# Patient Record
Sex: FEMALE | Race: WHITE | NOT HISPANIC OR LATINO | Employment: UNEMPLOYED | URBAN - METROPOLITAN AREA
[De-identification: names, ages, dates, MRNs, and addresses within clinical notes are randomized per-mention and may not be internally consistent; named-entity substitution may affect disease eponyms.]

---

## 2022-08-02 ENCOUNTER — OFFICE VISIT (OUTPATIENT)
Dept: FAMILY MEDICINE | Facility: CLINIC | Age: 27
End: 2022-08-02
Payer: OTHER GOVERNMENT

## 2022-08-02 VITALS
WEIGHT: 144.94 LBS | HEART RATE: 76 BPM | DIASTOLIC BLOOD PRESSURE: 71 MMHG | SYSTOLIC BLOOD PRESSURE: 121 MMHG | RESPIRATION RATE: 16 BRPM | BODY MASS INDEX: 25.68 KG/M2 | HEIGHT: 63 IN

## 2022-08-02 DIAGNOSIS — E03.8 OTHER SPECIFIED HYPOTHYROIDISM: ICD-10-CM

## 2022-08-02 DIAGNOSIS — E10.9 TYPE 1 DIABETES MELLITUS WITHOUT COMPLICATION: Primary | ICD-10-CM

## 2022-08-02 DIAGNOSIS — E03.9 HYPOTHYROIDISM, UNSPECIFIED TYPE: ICD-10-CM

## 2022-08-02 DIAGNOSIS — Z80.8 FAMILY HISTORY OF MELANOMA: ICD-10-CM

## 2022-08-02 DIAGNOSIS — M25.50 POLYARTHRALGIA: ICD-10-CM

## 2022-08-02 PROCEDURE — 99999 PR PBB SHADOW E&M-NEW PATIENT-LVL IV: ICD-10-PCS | Mod: PBBFAC,,, | Performed by: FAMILY MEDICINE

## 2022-08-02 PROCEDURE — 99205 PR OFFICE/OUTPT VISIT, NEW, LEVL V, 60-74 MIN: ICD-10-PCS | Mod: S$PBB,,, | Performed by: FAMILY MEDICINE

## 2022-08-02 PROCEDURE — 99205 OFFICE O/P NEW HI 60 MIN: CPT | Mod: S$PBB,,, | Performed by: FAMILY MEDICINE

## 2022-08-02 PROCEDURE — 99204 OFFICE O/P NEW MOD 45 MIN: CPT | Mod: PBBFAC | Performed by: FAMILY MEDICINE

## 2022-08-02 PROCEDURE — 99999 PR PBB SHADOW E&M-NEW PATIENT-LVL IV: CPT | Mod: PBBFAC,,, | Performed by: FAMILY MEDICINE

## 2022-08-02 RX ORDER — LEVOTHYROXINE SODIUM 100 UG/1
100 TABLET ORAL
COMMUNITY
End: 2022-08-19 | Stop reason: SDUPTHER

## 2022-08-02 RX ORDER — BUSPIRONE HYDROCHLORIDE 10 MG/1
10 TABLET ORAL 2 TIMES DAILY
COMMUNITY
End: 2023-02-28 | Stop reason: SDUPTHER

## 2022-08-02 RX ORDER — INSULIN ASPART 100 [IU]/ML
INJECTION, SOLUTION INTRAVENOUS; SUBCUTANEOUS
COMMUNITY
End: 2022-11-01 | Stop reason: SDUPTHER

## 2022-08-02 NOTE — PROGRESS NOTES
Subjective:       Patient ID: Shirlene Shook is a 26 y.o. female.    Chief Complaint: Establish Care    HPI  26 year old female comes in to establish care. She has a 'lot of ai' issues. She says that she wakes up in the morning with joint pain and there was a concern for RA. She says that she is DM type 1. She says she had a pump and was very well controlled. She has not been to endo and has been doing injections and is not having as much control. She was dx'd 10 years and 5 days ago. She was previously on omnipod and now dexcom g6.    Mom with h/o melanoma.    She has not seen ophthalmology in some time.    PMH, PSH, ALLERGIES, SH, FH reviewed in nurse's notes above  Medications reconciled in the nurse's notes    Review of Systems   Constitutional: Negative for chills and fever.   HENT: Negative for congestion, ear pain, postnasal drip, rhinorrhea, sore throat and trouble swallowing.    Eyes: Negative for redness and itching.   Respiratory: Negative for cough, shortness of breath and wheezing.    Cardiovascular: Negative for chest pain and palpitations.   Gastrointestinal: Negative for abdominal pain, diarrhea, nausea and vomiting.   Genitourinary: Negative for dysuria and frequency.   Musculoskeletal: Positive for arthralgias.   Skin: Negative for rash.   Neurological: Negative for weakness and headaches.       Objective:      Physical Exam  Vitals and nursing note reviewed.   Constitutional:       General: She is not in acute distress.     Appearance: She is well-developed.   HENT:      Head: Normocephalic and atraumatic.   Eyes:      Conjunctiva/sclera: Conjunctivae normal.      Pupils: Pupils are equal, round, and reactive to light.   Neck:      Thyroid: No thyromegaly.   Cardiovascular:      Rate and Rhythm: Normal rate and regular rhythm.      Heart sounds: Normal heart sounds.   Pulmonary:      Effort: Pulmonary effort is normal. No respiratory distress.      Breath sounds: Normal breath sounds. No wheezing.    Abdominal:      General: Bowel sounds are normal.      Palpations: Abdomen is soft.      Tenderness: There is no abdominal tenderness.   Musculoskeletal:         General: Normal range of motion.      Cervical back: Normal range of motion and neck supple.   Lymphadenopathy:      Cervical: No cervical adenopathy.   Skin:     General: Skin is warm and dry.      Findings: No rash.   Neurological:      Mental Status: She is alert and oriented to person, place, and time.   Psychiatric:         Behavior: Behavior normal.          Assessment/Plan:       Problem List Items Addressed This Visit        Endocrine    Other specified hypothyroidism      Other Visit Diagnoses     Type 1 diabetes mellitus without complication    -  Primary    Relevant Medications    insulin aspart U-100 (NOVOLOG) 100 unit/mL injection    insulin glargine,hum.rec.anlog (LANTUS SUBQ)    Other Relevant Orders    Ambulatory referral/consult to Endocrinology    Ambulatory referral/consult to Ophthalmology    Hemoglobin A1C    Hypothyroidism, unspecified type        Relevant Orders    TSH    Polyarthralgia        Relevant Orders    TYESHA Screen w/Reflex    Rheumatoid Factor    CYCLIC CITRUL PEPTIDE ANTIBODY, IGG    Sedimentation rate    C-Reactive Protein        RTC if condition acutely worsens or any other concerns, otherwise RTC as scheduled

## 2022-08-05 ENCOUNTER — CLINICAL SUPPORT (OUTPATIENT)
Dept: FAMILY MEDICINE | Facility: CLINIC | Age: 27
End: 2022-08-05
Payer: OTHER GOVERNMENT

## 2022-08-05 ENCOUNTER — TELEPHONE (OUTPATIENT)
Dept: FAMILY MEDICINE | Facility: CLINIC | Age: 27
End: 2022-08-05
Payer: OTHER GOVERNMENT

## 2022-08-05 DIAGNOSIS — E03.9 HYPOTHYROIDISM, UNSPECIFIED TYPE: ICD-10-CM

## 2022-08-05 DIAGNOSIS — M25.50 POLYARTHRALGIA: ICD-10-CM

## 2022-08-05 DIAGNOSIS — E10.9 TYPE 1 DIABETES MELLITUS WITHOUT COMPLICATION: ICD-10-CM

## 2022-08-05 LAB
ALBUMIN SERPL BCP-MCNC: 4 G/DL (ref 3.5–5.2)
ALP SERPL-CCNC: 54 U/L (ref 55–135)
ALT SERPL W/O P-5'-P-CCNC: 13 U/L (ref 10–44)
ANION GAP SERPL CALC-SCNC: 7 MMOL/L (ref 8–16)
AST SERPL-CCNC: 15 U/L (ref 10–40)
BILIRUB SERPL-MCNC: 0.4 MG/DL (ref 0.1–1)
BUN SERPL-MCNC: 11 MG/DL (ref 6–20)
CALCIUM SERPL-MCNC: 9.8 MG/DL (ref 8.7–10.5)
CHLORIDE SERPL-SCNC: 102 MMOL/L (ref 95–110)
CHOLEST SERPL-MCNC: 129 MG/DL (ref 120–199)
CHOLEST/HDLC SERPL: 2.9 {RATIO} (ref 2–5)
CO2 SERPL-SCNC: 27 MMOL/L (ref 23–29)
CREAT SERPL-MCNC: 0.8 MG/DL (ref 0.5–1.4)
ERYTHROCYTE [SEDIMENTATION RATE] IN BLOOD BY WESTERGREN METHOD: 5 MM/HR (ref 0–20)
EST. GFR  (NO RACE VARIABLE): >60 ML/MIN/1.73 M^2
GLUCOSE SERPL-MCNC: 172 MG/DL (ref 70–110)
HDLC SERPL-MCNC: 45 MG/DL (ref 40–75)
HDLC SERPL: 34.9 % (ref 20–50)
LDLC SERPL CALC-MCNC: 70 MG/DL (ref 63–159)
NONHDLC SERPL-MCNC: 84 MG/DL
POTASSIUM SERPL-SCNC: 5.1 MMOL/L (ref 3.5–5.1)
PROT SERPL-MCNC: 6.7 G/DL (ref 6–8.4)
RHEUMATOID FACT SERPL-ACNC: <13 IU/ML (ref 0–15)
SODIUM SERPL-SCNC: 136 MMOL/L (ref 136–145)
T4 FREE SERPL-MCNC: 1.02 NG/DL (ref 0.71–1.51)
TRIGL SERPL-MCNC: 70 MG/DL (ref 30–150)
TSH SERPL DL<=0.005 MIU/L-ACNC: 0.35 UIU/ML (ref 0.4–4)

## 2022-08-05 PROCEDURE — 85651 RBC SED RATE NONAUTOMATED: CPT | Performed by: FAMILY MEDICINE

## 2022-08-05 PROCEDURE — 86200 CCP ANTIBODY: CPT | Performed by: FAMILY MEDICINE

## 2022-08-05 PROCEDURE — 84439 ASSAY OF FREE THYROXINE: CPT | Performed by: FAMILY MEDICINE

## 2022-08-05 PROCEDURE — 83036 HEMOGLOBIN GLYCOSYLATED A1C: CPT | Performed by: FAMILY MEDICINE

## 2022-08-05 PROCEDURE — 80061 LIPID PANEL: CPT | Performed by: FAMILY MEDICINE

## 2022-08-05 PROCEDURE — 86038 ANTINUCLEAR ANTIBODIES: CPT | Performed by: FAMILY MEDICINE

## 2022-08-05 PROCEDURE — 36415 COLL VENOUS BLD VENIPUNCTURE: CPT | Mod: PBBFAC

## 2022-08-05 PROCEDURE — 86140 C-REACTIVE PROTEIN: CPT | Performed by: FAMILY MEDICINE

## 2022-08-05 PROCEDURE — 86039 ANTINUCLEAR ANTIBODIES (ANA): CPT | Performed by: FAMILY MEDICINE

## 2022-08-05 PROCEDURE — 86235 NUCLEAR ANTIGEN ANTIBODY: CPT | Mod: 59 | Performed by: FAMILY MEDICINE

## 2022-08-05 PROCEDURE — 86431 RHEUMATOID FACTOR QUANT: CPT | Performed by: FAMILY MEDICINE

## 2022-08-05 PROCEDURE — 80053 COMPREHEN METABOLIC PANEL: CPT | Performed by: FAMILY MEDICINE

## 2022-08-05 PROCEDURE — 84443 ASSAY THYROID STIM HORMONE: CPT | Performed by: FAMILY MEDICINE

## 2022-08-05 RX ORDER — INSULIN GLARGINE 100 [IU]/ML
36 INJECTION, SOLUTION SUBCUTANEOUS NIGHTLY
Qty: 20 ML | Refills: 1 | Status: SHIPPED | OUTPATIENT
Start: 2022-08-05 | End: 2022-08-31 | Stop reason: SDUPTHER

## 2022-08-06 LAB
CCP AB SER IA-ACNC: <0.5 U/ML
CRP SERPL-MCNC: 1 MG/L (ref 0–8.2)
ESTIMATED AVG GLUCOSE: 183 MG/DL (ref 68–131)
HBA1C MFR BLD: 8 % (ref 4–5.6)

## 2022-08-08 LAB
ANA PATTERN 1: NORMAL
ANA PATTERN 2: NORMAL
ANA SER QL IF: POSITIVE
ANA TITER 2: NORMAL
ANA TITR SER IF: NORMAL {TITER}

## 2022-08-09 LAB
ANTI SM ANTIBODY: 0.09 RATIO (ref 0–0.99)
ANTI SM/RNP ANTIBODY: 0.11 RATIO (ref 0–0.99)
ANTI-SM INTERPRETATION: NEGATIVE
ANTI-SM/RNP INTERPRETATION: NEGATIVE
ANTI-SSA ANTIBODY: 0.05 RATIO (ref 0–0.99)
ANTI-SSA INTERPRETATION: NEGATIVE
ANTI-SSB ANTIBODY: 0.08 RATIO (ref 0–0.99)
ANTI-SSB INTERPRETATION: NEGATIVE
DSDNA AB SER-ACNC: NORMAL [IU]/ML

## 2022-08-12 ENCOUNTER — TELEPHONE (OUTPATIENT)
Dept: FAMILY MEDICINE | Facility: CLINIC | Age: 27
End: 2022-08-12
Payer: OTHER GOVERNMENT

## 2022-08-12 DIAGNOSIS — R76.8 POSITIVE ANA (ANTINUCLEAR ANTIBODY): Primary | ICD-10-CM

## 2022-08-12 NOTE — TELEPHONE ENCOUNTER
----- Message from Leanne Harrell MD sent at 8/12/2022  7:48 AM CDT -----  Please let rukhsana know that her TYESHA is elevated (as it was in [place] I believe). I am going to send a referral to [facility] with these results for her. Her a1c is 8.0 and I know she has an appt with Dr. Chowdary. TSH is VERY minimally off - but doesn't need to be addressed because her t4 is okay. Finally, her inflammatory markers, cholesterol, kidney numbers are great.

## 2022-08-17 LAB
LEFT EYE DM RETINOPATHY: NORMAL
RIGHT EYE DM RETINOPATHY: NORMAL

## 2022-08-19 RX ORDER — LEVOTHYROXINE SODIUM 100 UG/1
100 TABLET ORAL
Qty: 90 TABLET | Refills: 3 | Status: SHIPPED | OUTPATIENT
Start: 2022-08-19 | End: 2023-03-02

## 2022-08-19 NOTE — TELEPHONE ENCOUNTER
No new care gaps identified.  [facility] Embedded Care Gaps. Reference number: [id number]. 8/19/2022   1:21:20 PM CDT

## 2022-08-31 RX ORDER — INSULIN GLARGINE 100 [IU]/ML
36 INJECTION, SOLUTION SUBCUTANEOUS NIGHTLY
Qty: 20 ML | Refills: 1 | Status: SHIPPED | OUTPATIENT
Start: 2022-08-31 | End: 2022-11-01 | Stop reason: SDUPTHER

## 2022-08-31 NOTE — TELEPHONE ENCOUNTER
Spoke with patient and she has appointment with Dr. Schmitt on 9/7/22. Patient accepts appointment. Patient states she only has 2 days left of insulin (lantus) needing refill to last her until appointment    Rx pending   Please advise.

## 2022-08-31 NOTE — TELEPHONE ENCOUNTER
No new care gaps identified.  [facility] Embedded Care Gaps. Reference number: [id number]. 8/31/2022   1:52:49 PM CDT

## 2022-08-31 NOTE — TELEPHONE ENCOUNTER
----- Message from Azeb Griffiths sent at 2022 11:42 AM CDT -----  Contact: self  Shirlene Shook  MRN: [id number]  : 1995  PCP: Leanne Harrell  Home Phone      [phone]  Work Phone      Not on file.  Mobile          [phone]      MESSAGE: Pt called stating she had an appt with the endocrinologist, but they called her today and provider has had a death in the family therefore pushing her appt back until end of October. Pt states she cannot wait that long for diabetic care and is asking if Dr Harrell can help her get back on her insulin pump.       Phone:  [phone]

## 2022-09-15 DIAGNOSIS — E11.9 TYPE 2 DIABETES MELLITUS WITHOUT COMPLICATION: ICD-10-CM

## 2022-10-04 RX ORDER — INSULIN GLARGINE 100 [IU]/ML
INJECTION, SOLUTION SUBCUTANEOUS
Refills: 0 | OUTPATIENT
Start: 2022-10-04

## 2022-10-04 NOTE — TELEPHONE ENCOUNTER
Refill Decision Note   Shirlene Shook  is requesting a refill authorization.  Brief Assessment and Rationale for Refill:  Quick Discontinue     Medication Therapy Plan:      Pharmacy is requesting new scripts for the following medications without required information, (sig/ frequency/qty/etc)      Medication Reconciliation Completed: No     Comments: Pharmacies have been requesting medications for patients without required information, (sig, frequency, qty, etc.). In addition, requests are sent for medication(s) pt. are currently not taking, and medications patients have never taken.    We have spoken to the pharmacies about these request types and advised their teams previously that we are unable to assess these New Script requests and require all details for these requests. This is a known issue and has been reported.     Note composed:1:22 PM 10/04/2022

## 2022-10-04 NOTE — TELEPHONE ENCOUNTER
No new care gaps identified.  [facility] Embedded Care Gaps. Reference number: [id number]. 10/04/2022   1:22:17 PM CDT

## 2022-11-01 DIAGNOSIS — E10.65 UNCONTROLLED TYPE 1 DIABETES MELLITUS WITH HYPERGLYCEMIA: ICD-10-CM

## 2022-11-01 RX ORDER — INSULIN GLARGINE 100 [IU]/ML
36 INJECTION, SOLUTION SUBCUTANEOUS NIGHTLY
Qty: 15 ML | Refills: 1 | Status: CANCELLED | OUTPATIENT
Start: 2022-11-01 | End: 2023-11-01

## 2022-11-23 DIAGNOSIS — E11.9 TYPE 2 DIABETES MELLITUS WITHOUT COMPLICATION: ICD-10-CM

## 2022-12-30 ENCOUNTER — TELEPHONE (OUTPATIENT)
Dept: FAMILY MEDICINE | Facility: CLINIC | Age: 27
End: 2022-12-30
Payer: OTHER GOVERNMENT

## 2022-12-30 ENCOUNTER — TELEPHONE (OUTPATIENT)
Dept: PODIATRY | Facility: CLINIC | Age: 27
End: 2022-12-30
Payer: OTHER GOVERNMENT

## 2022-12-30 DIAGNOSIS — E10.69 TYPE 1 DIABETES MELLITUS WITH OTHER SPECIFIED COMPLICATION: ICD-10-CM

## 2022-12-30 DIAGNOSIS — S92.909D CLOSED FRACTURE OF FOOT WITH ROUTINE HEALING, UNSPECIFIED LATERALITY, SUBSEQUENT ENCOUNTER: Primary | ICD-10-CM

## 2022-12-30 NOTE — TELEPHONE ENCOUNTER
Pt states seen in er yesterday for broken foot and would like to know if she can be referred to ortho who accepts .     Pt also wanting endo to be switching to [facility]

## 2022-12-30 NOTE — TELEPHONE ENCOUNTER
Call pt in regards to message in portal. Pt didn't answer. Left VM asking the pt to call back to schedule an appointment to see a provider. ----- Message from Mackenzie Calero sent at 12/30/2022  8:37 AM CST -----  Type:  Needs Medical Advice    Who Called: pt  Symptoms (please be specific): pt has broken her foot and is requesting to be seen today if possible and the pt was seen in the ER on 12/29/22  How long has patient had these symptoms:  1 day    Would the patient rather a call back or a response via [facility]? call  Best Call Back Number: [phone]  Additional Information:

## 2023-01-03 NOTE — TELEPHONE ENCOUNTER
Patient seen in ER on 12/29/2022, patient states that she broke her foot. Patient is asking for ortho referral.   Patient is anna

## 2023-01-04 ENCOUNTER — PATIENT MESSAGE (OUTPATIENT)
Dept: FAMILY MEDICINE | Facility: CLINIC | Age: 28
End: 2023-01-04
Payer: OTHER GOVERNMENT

## 2023-01-06 NOTE — TELEPHONE ENCOUNTER
Referral faxed to ortho and endo provider changed and processed through [facility] with approval status.

## 2023-01-09 ENCOUNTER — PATIENT MESSAGE (OUTPATIENT)
Dept: ADMINISTRATIVE | Facility: HOSPITAL | Age: 28
End: 2023-01-09
Payer: OTHER GOVERNMENT

## 2023-01-10 ENCOUNTER — PATIENT OUTREACH (OUTPATIENT)
Dept: ADMINISTRATIVE | Facility: HOSPITAL | Age: 28
End: 2023-01-10
Payer: OTHER GOVERNMENT

## 2023-01-10 NOTE — PROGRESS NOTES
Chart reviewed, no Links immunization record noted.  No new results noted on [facility] or [facility] web site.  Care Everywhere updated.   Patient care coordination note  LOV with PCP 8/02/2022.  Attempted to contact patient to discuss scheduling DM lab visit/PCP visit, Pap Smear, Eye Exam, unable to contact (fast busy signal).  
Include Location In Plan?: Yes
Detail Level: Generalized
Detail Level: Detailed

## 2023-02-17 ENCOUNTER — TELEPHONE (OUTPATIENT)
Dept: FAMILY MEDICINE | Facility: CLINIC | Age: 28
End: 2023-02-17
Payer: OTHER GOVERNMENT

## 2023-02-17 NOTE — TELEPHONE ENCOUNTER
----- Message from Azeb Griffiths sent at 2/15/2023  9:37 AM CST -----  Contact: Suzanna/[facility] Destini Shook  MRN: [id number]  : 1995  PCP: Leanne Harrell  Home Phone      [phone]  Work Phone      Not on file.  Mobile          [phone]      MESSAGE:   Suzanna with [facility] called asking if provider can write a referral for pt that was seen in Sept.   Suzanna stated that Iggy told her that this referral would need to be done thru their portal so they can get paid for that visit.   Please call Suzanna with any questions.    Phone:  [phone]  Fax:  [phone]

## 2023-02-17 NOTE — TELEPHONE ENCOUNTER
Attempted to contact department back x2, LVM for return call. Needing to know what date of appointment was to submit referral and what patient was seen for

## 2023-02-28 ENCOUNTER — CLINICAL SUPPORT (OUTPATIENT)
Dept: FAMILY MEDICINE | Facility: CLINIC | Age: 28
End: 2023-02-28
Payer: OTHER GOVERNMENT

## 2023-02-28 ENCOUNTER — OFFICE VISIT (OUTPATIENT)
Dept: FAMILY MEDICINE | Facility: CLINIC | Age: 28
End: 2023-02-28
Payer: OTHER GOVERNMENT

## 2023-02-28 VITALS
BODY MASS INDEX: 27.56 KG/M2 | DIASTOLIC BLOOD PRESSURE: 78 MMHG | SYSTOLIC BLOOD PRESSURE: 129 MMHG | HEART RATE: 80 BPM | RESPIRATION RATE: 16 BRPM | WEIGHT: 155.56 LBS | HEIGHT: 63 IN

## 2023-02-28 DIAGNOSIS — E03.9 HYPOTHYROIDISM, UNSPECIFIED TYPE: Primary | ICD-10-CM

## 2023-02-28 DIAGNOSIS — R76.8 FALSE POSITIVE ANA: ICD-10-CM

## 2023-02-28 DIAGNOSIS — E10.65 TYPE 1 DIABETES MELLITUS WITH HYPERGLYCEMIA: ICD-10-CM

## 2023-02-28 DIAGNOSIS — K90.0 CELIAC DISEASE: ICD-10-CM

## 2023-02-28 DIAGNOSIS — E10.65 UNCONTROLLED TYPE 1 DIABETES MELLITUS WITH HYPERGLYCEMIA: ICD-10-CM

## 2023-02-28 DIAGNOSIS — E03.9 HYPOTHYROIDISM, UNSPECIFIED TYPE: ICD-10-CM

## 2023-02-28 LAB
ESTIMATED AVG GLUCOSE: 160 MG/DL (ref 68–131)
HBA1C MFR BLD: 7.2 % (ref 4–5.6)
T4 FREE SERPL-MCNC: 1.21 NG/DL (ref 0.71–1.51)
TSH SERPL DL<=0.005 MIU/L-ACNC: 0.05 UIU/ML (ref 0.4–4)

## 2023-02-28 PROCEDURE — 84443 ASSAY THYROID STIM HORMONE: CPT | Performed by: FAMILY MEDICINE

## 2023-02-28 PROCEDURE — 86039 ANTINUCLEAR ANTIBODIES (ANA): CPT | Performed by: FAMILY MEDICINE

## 2023-02-28 PROCEDURE — 86235 NUCLEAR ANTIGEN ANTIBODY: CPT | Mod: 59 | Performed by: FAMILY MEDICINE

## 2023-02-28 PROCEDURE — 36415 COLL VENOUS BLD VENIPUNCTURE: CPT | Mod: PBBFAC

## 2023-02-28 PROCEDURE — 99999 PR PBB SHADOW E&M-EST. PATIENT-LVL IV: ICD-10-PCS | Mod: PBBFAC,,, | Performed by: FAMILY MEDICINE

## 2023-02-28 PROCEDURE — 99214 OFFICE O/P EST MOD 30 MIN: CPT | Mod: PBBFAC | Performed by: FAMILY MEDICINE

## 2023-02-28 PROCEDURE — 99214 PR OFFICE/OUTPT VISIT, EST, LEVL IV, 30-39 MIN: ICD-10-PCS | Mod: S$PBB,,, | Performed by: FAMILY MEDICINE

## 2023-02-28 PROCEDURE — 83036 HEMOGLOBIN GLYCOSYLATED A1C: CPT | Performed by: FAMILY MEDICINE

## 2023-02-28 PROCEDURE — 86038 ANTINUCLEAR ANTIBODIES: CPT | Performed by: FAMILY MEDICINE

## 2023-02-28 PROCEDURE — 99999 PR PBB SHADOW E&M-EST. PATIENT-LVL IV: CPT | Mod: PBBFAC,,, | Performed by: FAMILY MEDICINE

## 2023-02-28 PROCEDURE — 99214 OFFICE O/P EST MOD 30 MIN: CPT | Mod: S$PBB,,, | Performed by: FAMILY MEDICINE

## 2023-02-28 PROCEDURE — 84439 ASSAY OF FREE THYROXINE: CPT | Performed by: FAMILY MEDICINE

## 2023-02-28 RX ORDER — INSULIN ASPART 100 [IU]/ML
INJECTION, SOLUTION INTRAVENOUS; SUBCUTANEOUS
Qty: 42 ML | Refills: 1 | Status: SHIPPED | OUTPATIENT
Start: 2023-02-28

## 2023-02-28 RX ORDER — INSULIN GLARGINE 100 [IU]/ML
40 INJECTION, SOLUTION SUBCUTANEOUS NIGHTLY
Qty: 42 ML | Refills: 1 | Status: SHIPPED | OUTPATIENT
Start: 2023-02-28 | End: 2024-02-28

## 2023-02-28 RX ORDER — BUSPIRONE HYDROCHLORIDE 10 MG/1
10 TABLET ORAL 2 TIMES DAILY
Qty: 180 TABLET | Refills: 3 | Status: SHIPPED | OUTPATIENT
Start: 2023-02-28 | End: 2023-11-20

## 2023-02-28 NOTE — PROGRESS NOTES
Subjective:       Patient ID: Shirlene Shook is a 27 y.o. female.    Chief Complaint: Anxiety, Medication Refill, and Referral    HPI  27 year old female comes in for referrals. Since her last visit she was able to see endocrine but was unable to get orders for her insulin pump. She has been using sliding scale aspart and lantus - 39 U daily. Her blood sugars are more tightly controlled and she is seeing weight gain. She also c/o headaches. She has begun to wear her glasses once again. She also notes issues with her joints and would like to have a re-evaluation of her iman.    She is concered about a flare of her celiacs as she has had significnat gi discomfort since having potato salad for lilly [place]uen and she would like to see a gi specialist.    PMH, PSH, ALLERGIES, SH, FH reviewed in nurse's notes above  Medications reconciled in the nurse's notes      Review of Systems   Constitutional:  Positive for unexpected weight change. Negative for chills and fever.   HENT:  Negative for congestion, ear pain, postnasal drip, rhinorrhea, sore throat and trouble swallowing.    Eyes:  Negative for redness and itching.   Respiratory:  Negative for cough, shortness of breath and wheezing.    Cardiovascular:  Negative for chest pain and palpitations.   Gastrointestinal:  Positive for abdominal pain. Negative for diarrhea, nausea and vomiting.   Genitourinary:  Negative for dysuria and frequency.   Skin:  Negative for rash.   Neurological:  Positive for headaches. Negative for weakness.     Objective:      Physical Exam  Vitals and nursing note reviewed.   Constitutional:       General: She is not in acute distress.     Appearance: She is well-developed.   HENT:      Head: Normocephalic and atraumatic.   Eyes:      Conjunctiva/sclera: Conjunctivae normal.      Pupils: Pupils are equal, round, and reactive to light.   Neck:      Thyroid: No thyromegaly.   Cardiovascular:      Rate and Rhythm: Normal rate and regular rhythm.       Heart sounds: Normal heart sounds.   Pulmonary:      Effort: Pulmonary effort is normal. No respiratory distress.      Breath sounds: Normal breath sounds. No wheezing.   Abdominal:      General: Bowel sounds are normal.      Palpations: Abdomen is soft.      Tenderness: There is no abdominal tenderness.   Musculoskeletal:         General: Normal range of motion.      Cervical back: Normal range of motion and neck supple.   Lymphadenopathy:      Cervical: No cervical adenopathy.   Skin:     General: Skin is warm and dry.      Findings: No rash.   Neurological:      Mental Status: She is alert and oriented to person, place, and time.   Psychiatric:         Behavior: Behavior normal.        Assessment/Plan:       Problem List Items Addressed This Visit          Endocrine    Type 1 diabetes mellitus with hyperglycemia    Relevant Medications    insulin (LANTUS SOLOSTAR U-100 INSULIN) glargine 100 units/mL SubQ pen    insulin aspart U-100 (NOVOLOG) 100 unit/mL (3 mL) InPn pen    Other Relevant Orders    Ambulatory referral/consult to Endocrinology    Ambulatory referral/consult to Endocrinology    Hemoglobin A1C     Other Visit Diagnoses       Hypothyroidism, unspecified type    -  Primary    Relevant Orders    TSH    False positive iman        Relevant Orders    IMAN Screen w/Reflex    Celiac disease        Relevant Orders    Ambulatory referral/consult to Gastroenterology    Uncontrolled type 1 diabetes mellitus with hyperglycemia        Relevant Medications    insulin (LANTUS SOLOSTAR U-100 INSULIN) glargine 100 units/mL SubQ pen    insulin aspart U-100 (NOVOLOG) 100 unit/mL (3 mL) InPn pen        RTC if condition acutely worsens or any other concerns, otherwise RTC as scheduled

## 2023-03-02 DIAGNOSIS — E03.9 HYPOTHYROIDISM, UNSPECIFIED TYPE: Primary | ICD-10-CM

## 2023-03-02 LAB
ANA PATTERN 1: NORMAL
ANA SER QL IF: POSITIVE
ANA TITR SER IF: NORMAL {TITER}
ANTI SM ANTIBODY: 0.08 RATIO (ref 0–0.99)
ANTI SM/RNP ANTIBODY: 0.1 RATIO (ref 0–0.99)
ANTI-SM INTERPRETATION: NEGATIVE
ANTI-SM/RNP INTERPRETATION: NEGATIVE
ANTI-SSA ANTIBODY: 0.07 RATIO (ref 0–0.99)
ANTI-SSA INTERPRETATION: NEGATIVE
ANTI-SSB ANTIBODY: 0.06 RATIO (ref 0–0.99)
ANTI-SSB INTERPRETATION: NEGATIVE
DSDNA AB SER-ACNC: NORMAL [IU]/ML

## 2023-03-02 RX ORDER — LEVOTHYROXINE SODIUM 88 UG/1
88 TABLET ORAL
Qty: 45 TABLET | Refills: 0 | Status: SHIPPED | OUTPATIENT
Start: 2023-03-02 | End: 2023-07-05

## 2023-04-03 ENCOUNTER — PATIENT MESSAGE (OUTPATIENT)
Dept: ADMINISTRATIVE | Facility: HOSPITAL | Age: 28
End: 2023-04-03
Payer: OTHER GOVERNMENT

## 2023-04-12 ENCOUNTER — TELEPHONE (OUTPATIENT)
Dept: ENDOCRINOLOGY | Facility: CLINIC | Age: 28
End: 2023-04-12

## 2023-04-12 ENCOUNTER — OFFICE VISIT (OUTPATIENT)
Dept: ENDOCRINOLOGY | Facility: CLINIC | Age: 28
End: 2023-04-12
Payer: OTHER GOVERNMENT

## 2023-04-12 VITALS
BODY MASS INDEX: 26.57 KG/M2 | DIASTOLIC BLOOD PRESSURE: 68 MMHG | HEIGHT: 63 IN | RESPIRATION RATE: 16 BRPM | WEIGHT: 149.94 LBS | SYSTOLIC BLOOD PRESSURE: 119 MMHG | HEART RATE: 73 BPM

## 2023-04-12 DIAGNOSIS — E06.3 HASHIMOTO'S THYROIDITIS: ICD-10-CM

## 2023-04-12 DIAGNOSIS — K90.0 CELIAC DISEASE: ICD-10-CM

## 2023-04-12 DIAGNOSIS — E10.65 TYPE 1 DIABETES MELLITUS WITH HYPERGLYCEMIA: Primary | ICD-10-CM

## 2023-04-12 PROCEDURE — 99214 OFFICE O/P EST MOD 30 MIN: CPT | Mod: PBBFAC | Performed by: STUDENT IN AN ORGANIZED HEALTH CARE EDUCATION/TRAINING PROGRAM

## 2023-04-12 PROCEDURE — 99999 PR PBB SHADOW E&M-EST. PATIENT-LVL IV: ICD-10-PCS | Mod: PBBFAC,,, | Performed by: STUDENT IN AN ORGANIZED HEALTH CARE EDUCATION/TRAINING PROGRAM

## 2023-04-12 PROCEDURE — 99204 OFFICE O/P NEW MOD 45 MIN: CPT | Mod: S$PBB,,, | Performed by: STUDENT IN AN ORGANIZED HEALTH CARE EDUCATION/TRAINING PROGRAM

## 2023-04-12 PROCEDURE — 99204 PR OFFICE/OUTPT VISIT, NEW, LEVL IV, 45-59 MIN: ICD-10-PCS | Mod: S$PBB,,, | Performed by: STUDENT IN AN ORGANIZED HEALTH CARE EDUCATION/TRAINING PROGRAM

## 2023-04-12 PROCEDURE — 99999 PR PBB SHADOW E&M-EST. PATIENT-LVL IV: CPT | Mod: PBBFAC,,, | Performed by: STUDENT IN AN ORGANIZED HEALTH CARE EDUCATION/TRAINING PROGRAM

## 2023-04-12 RX ORDER — INSULIN PMP CART,AUT,G6/7,CNTR
1 EACH SUBCUTANEOUS
Qty: 10 EACH | Refills: 11 | Status: CANCELLED | OUTPATIENT
Start: 2023-04-12

## 2023-04-12 RX ORDER — INSULIN PMP CART,AUT,G6/7,CNTR
1 EACH SUBCUTANEOUS ONCE
Qty: 1 EACH | Refills: 0 | Status: CANCELLED | OUTPATIENT
Start: 2023-04-12 | End: 2023-04-12

## 2023-04-12 NOTE — TELEPHONE ENCOUNTER
[facility] Confirmation received.  Supplier-[employer]      ----- Message from Javed Luu MD sent at 4/12/2023  2:51 PM CDT -----  Dexcom G6 orders to [facility]

## 2023-04-12 NOTE — PROGRESS NOTES
Subjective:      Patient ID: Shirlene Shook is a 27 y.o. female.    Chief Complaint:  Type 1 diabetes mellitus    History of Present Illness  This is a 27 y.o. female. with a past medical history of Type 1 diabetes mellitus, Hashimoto's thyroiditis here for evaluation.    Type 1 diabetes mellitus  Diagnosed around age 17    Current diabetes medications:  - Lantus 40 U HS  - Novolog ICR 1:10, ISF 1:20, target 100    Past diabetes medications:  - Classic Omnipod    Lab Results   Component Value Date    CREATININE 0.8 08/05/2022    EGFRNORACEVR >60 08/05/2022       Known diabetic complications: none    Weight trend:  Wt Readings from Last 8 Encounters:   04/12/23 68 kg (149 lb 14.6 oz)   02/28/23 70.5 kg (155 lb 8.6 oz)   09/07/22 65.5 kg (144 lb 6.4 oz)   08/02/22 65.8 kg (144 lb 15.2 oz)       Prior visit with diabetes education: yes    Current diet: Around 30 g carbs per day    Blood glucose monitoring at home: 4x/day  Home blood sugar records: 41 - 295  Any episodes of hypoglycemia? Yes    She has 2 children   had vasectomy      Diabetes Management Status  Statin: Not taking  ACE/ARB: Not taking    Screening or Prevention Patient's value   HgA1C Testing and Control   Lab Results   Component Value Date    HGBA1C 7.2 (H) 02/28/2023        LDL control Lab Results   Component Value Date    LDLCALC 70.0 08/05/2022      Nephropathy screening No results found for: MICALBCREAT     Last eye exam: Most Recent Eye Exam Date: Not Found      Hypothyroidism  Currently on levothyroxine 88 mcg daily - recently decreased by PCP  Reports takes 30 min before eating or drinking anything other than water.   Reports taking separate from multivitamins by at least 4 hours    Lab Results   Component Value Date    TSH 0.046 (L) 02/28/2023         Review of Systems  As above    Social and family history reviewed  Current medications and allergies reviewed    Objective:   /68 (BP Location: Right arm, Patient Position: Sitting, BP  "Method: Medium (Manual))   Pulse 73   Resp 16   Ht 5' 3" (1.6 m)   Wt 68 kg (149 lb 14.6 oz)   BMI 26.56 kg/m²   Physical Exam  Alert, oriented    BP Readings from Last 1 Encounters:   04/12/23 119/68      Wt Readings from Last 1 Encounters:   04/12/23 1422 68 kg (149 lb 14.6 oz)     Body mass index is 26.56 kg/m².    Lab Review:   Lab Results   Component Value Date    HGBA1C 7.2 (H) 02/28/2023     Lab Results   Component Value Date    CHOL 129 08/05/2022    HDL 45 08/05/2022    LDLCALC 70.0 08/05/2022    TRIG 70 08/05/2022    CHOLHDL 34.9 08/05/2022     Lab Results   Component Value Date     08/05/2022    K 5.1 08/05/2022     08/05/2022    CO2 27 08/05/2022     (H) 08/05/2022    BUN 11 08/05/2022    CREATININE 0.8 08/05/2022    CALCIUM 9.8 08/05/2022    PROT 6.7 08/05/2022    ALBUMIN 4.0 08/05/2022    BILITOT 0.4 08/05/2022    ALKPHOS 54 (L) 08/05/2022    AST 15 08/05/2022    ALT 13 08/05/2022    ANIONGAP 7 (L) 08/05/2022    TSH 0.046 (L) 02/28/2023       All pertinent labs reviewed    Assessment and Plan     Type 1 diabetes mellitus with hyperglycemia  A1c at goal but with glycemic variability. Needs CGM and insulin pump with closed loop communication system.     Patient is on an intensive insulin regimen with 4 daily injections and is testing glucose 4+ times daily. Patient has demonstrated an understanding of technology and is motivated to use the device correctly. Patient is expected to adhere to a diabetes treatment plan and is capable of recognizing alerts and alarms. Patient has recurrent, severe (BG <54) hypoglycemia and impaired awareness of hypoglycemia.    Patient's insulin regimen requires frequent adjustments based on BG results. Patient will receive an in-person visit every 6 months to assess adherence to CGM regimen and diabetes treatment.    Plan  - Start Tandem tslim X2 insulin pump orders   - Start Dexcom G6 CGM    Based on CGM reads will determine actual insulin needs and " calculate insulin pump settings    In the meantime, continue same MDI regimen          Hashimoto's thyroiditis  Recent TSH below goal, levothyroxine reduced by PCP  PCP will recheck levels    Celiac disease  Explained correlation with T1DM and Hashimoto's      Follow-up in 3 months    Javed Luu MD  Endocrinology

## 2023-04-12 NOTE — ASSESSMENT & PLAN NOTE
A1c at goal but with glycemic variability. Needs CGM and insulin pump with closed loop communication system.     Patient is on an intensive insulin regimen with 4 daily injections and is testing glucose 4+ times daily. Patient has demonstrated an understanding of technology and is motivated to use the device correctly. Patient is expected to adhere to a diabetes treatment plan and is capable of recognizing alerts and alarms. Patient has recurrent, severe (BG <54) hypoglycemia and impaired awareness of hypoglycemia.    Patient's insulin regimen requires frequent adjustments based on BG results. Patient will receive an in-person visit every 6 months to assess adherence to CGM regimen and diabetes treatment.    Plan  - Start Tandem tslim X2 insulin pump orders   - Start Dexcom G6 CGM    Based on CGM reads will determine actual insulin needs and calculate insulin pump settings    In the meantime, continue same MDI regimen

## 2023-04-13 ENCOUNTER — TELEPHONE (OUTPATIENT)
Dept: ENDOCRINOLOGY | Facility: CLINIC | Age: 28
End: 2023-04-13
Payer: OTHER GOVERNMENT

## 2023-04-13 NOTE — TELEPHONE ENCOUNTER
Faxed Tandem Form to [phone]. 4/13/23  Confirmation received.    -[facility]

## 2023-04-28 ENCOUNTER — PATIENT MESSAGE (OUTPATIENT)
Dept: ENDOCRINOLOGY | Facility: CLINIC | Age: 28
End: 2023-04-28
Payer: OTHER GOVERNMENT

## 2023-04-28 DIAGNOSIS — E10.65 UNCONTROLLED TYPE 1 DIABETES MELLITUS WITH HYPERGLYCEMIA: ICD-10-CM

## 2023-05-04 ENCOUNTER — TELEPHONE (OUTPATIENT)
Dept: FAMILY MEDICINE | Facility: CLINIC | Age: 28
End: 2023-05-04
Payer: OTHER GOVERNMENT

## 2023-05-04 NOTE — TELEPHONE ENCOUNTER
----- Message from Jonny Smith sent at 2023  1:55 PM CDT -----  Contact: Patient  Shirlene Shook  MRN: [id number]  : 1995  PCP: Leanne Harrell  Home Phone      [phone]  Work Phone      Not on file.  Mobile          [phone]      MESSAGE: states she needs clinical documentation form dr other than her endocrinologist Re: need for insulin pump    Call [phone]    PCP: Sharri

## 2023-05-04 NOTE — TELEPHONE ENCOUNTER
Patient states that [facility] medical supply will be sending fax over and requiring more chart notes for patient to receive her CGM.   Patient just calling to give heads up and verbal consent to send these records.

## 2023-05-05 ENCOUNTER — CLINICAL SUPPORT (OUTPATIENT)
Dept: FAMILY MEDICINE | Facility: CLINIC | Age: 28
End: 2023-05-05
Payer: OTHER GOVERNMENT

## 2023-05-05 DIAGNOSIS — J98.8 CONGESTION OF UPPER AIRWAY: Primary | ICD-10-CM

## 2023-05-05 LAB
CTP QC/QA: YES
SARS-COV-2 RDRP RESP QL NAA+PROBE: NEGATIVE

## 2023-05-05 PROCEDURE — 87635 SARS-COV-2 COVID-19 AMP PRB: CPT | Mod: PBBFAC

## 2023-05-05 NOTE — PROGRESS NOTES
"Patient swabbed for covid due to congestion and "not able to breath"  Patient in clinic with daughter, verbal given to perform covid swab from Dr. Harrell. Patient confirmed.       "

## 2023-05-15 ENCOUNTER — PATIENT OUTREACH (OUTPATIENT)
Dept: ADMINISTRATIVE | Facility: HOSPITAL | Age: 28
End: 2023-05-15
Payer: OTHER GOVERNMENT

## 2023-05-15 NOTE — PROGRESS NOTES
Chart reviewed, no Links immunization record noted.  No new results noted on [facility] or [facility] web site.  Care Everywhere updated.   Patient care coordination note  LOV with PCP 2/28/2023.  Attempted to contact patient to discuss scheduling pap smear and eye exam, unable to contact. (Number listed not in service)

## 2023-05-16 ENCOUNTER — PATIENT MESSAGE (OUTPATIENT)
Dept: FAMILY MEDICINE | Facility: CLINIC | Age: 28
End: 2023-05-16
Payer: OTHER GOVERNMENT

## 2023-05-17 DIAGNOSIS — E10.65 TYPE 1 DIABETES MELLITUS WITH HYPERGLYCEMIA: Primary | ICD-10-CM

## 2023-05-17 DIAGNOSIS — E10.65 UNCONTROLLED TYPE 1 DIABETES MELLITUS WITH HYPERGLYCEMIA: ICD-10-CM

## 2023-05-17 RX ORDER — INSULIN ASPART 100 [IU]/ML
INJECTION, SOLUTION INTRAVENOUS; SUBCUTANEOUS
Qty: 30 ML | Refills: 11 | Status: SHIPPED | OUTPATIENT
Start: 2023-05-17 | End: 2023-05-19 | Stop reason: SDUPTHER

## 2023-05-19 DIAGNOSIS — E10.65 UNCONTROLLED TYPE 1 DIABETES MELLITUS WITH HYPERGLYCEMIA: ICD-10-CM

## 2023-05-19 RX ORDER — INSULIN ASPART 100 [IU]/ML
INJECTION, SOLUTION INTRAVENOUS; SUBCUTANEOUS
Qty: 30 ML | Refills: 11 | Status: SHIPPED | OUTPATIENT
Start: 2023-05-19

## 2023-05-24 ENCOUNTER — DOCUMENTATION ONLY (OUTPATIENT)
Dept: ENDOCRINOLOGY | Facility: CLINIC | Age: 28
End: 2023-05-24
Payer: OTHER GOVERNMENT

## 2023-05-24 ENCOUNTER — CLINICAL SUPPORT (OUTPATIENT)
Dept: DIABETES | Facility: CLINIC | Age: 28
End: 2023-05-24
Payer: OTHER GOVERNMENT

## 2023-05-24 DIAGNOSIS — E10.65 TYPE 1 DIABETES MELLITUS WITH HYPERGLYCEMIA: ICD-10-CM

## 2023-05-24 DIAGNOSIS — Z46.81 INSULIN PUMP TRAINING: Primary | ICD-10-CM

## 2023-05-24 PROCEDURE — 99999 PR PBB SHADOW E&M-EST. PATIENT-LVL I: ICD-10-PCS | Mod: PBBFAC,,,

## 2023-05-24 PROCEDURE — 99999 PR PBB SHADOW E&M-EST. PATIENT-LVL I: CPT | Mod: PBBFAC,,,

## 2023-05-24 PROCEDURE — 99211 OFF/OP EST MAY X REQ PHY/QHP: CPT | Mod: PBBFAC

## 2023-05-24 PROCEDURE — G0108 DIAB MANAGE TRN  PER INDIV: HCPCS | Mod: PBBFAC

## 2023-05-24 NOTE — CARE UPDATE
Will start insulin pump with following settingsL  TDD with 0.5 U/kg/d is 34 units      Basal 0.7 U/h  ICR 1:13  ISF 1:50  IAT 5 h  Target 110

## 2023-05-25 NOTE — PROGRESS NOTES
"Diabetes Care Specialist Progress Note  Author: Blessing Coates RN  Date: 5/25/2023    Program Intake  Reason for Diabetes Program Visit:: Intervention  Type of Intervention:: Individual  Individual: Device Training  Device Training: Insulin Pump Start  Current diabetes risk level:: high  In the last 12 months, have you:: none  Permission to speak with others about care:: no    Lab Results   Component Value Date    HGBA1C 7.2 (H) 02/28/2023       Clinical     Pt states she has had DM1 for years and was on an Omnipod[classic] until recently. Pt is wearing a Dexcom G6.     TANDEM T-SLIM:X2 with Control IQ INSULIN PUMP START    Patient here today for insulin pump training and will be starting a T-Slim:X2 Insulin pump with Control IQ and Dexcom G6 integration. Pump training was provided per Tandem protocol.   Pt is new to pump therapy.      Details of pump therapy were covered with the patient.  Instructed and assisted in programming pump following T-slim "Reference Guide" step by step guide.  Pump "Options" menu on home screen was covered in detail.      Practiced with patient:  Filling and loading T-slim cartridge, filling tubing, and filling canula after inserting infusion set.  Insertion of [employer] XC infusion set; connecting and disconnecting infusion set.   Use of bolus menu: entering Blood glucose and carbs, and delivering bolus.     Details of Control IQ feature were covered with the patient:   - uses predicted CGM values to adjust delivery rates  - increases, decreases, or stops basal insulin delivery when sensor glucose predicted to be above/below pre-defined thresholds  - automatically delivers correction boluses up to once every 60 min     Reviewed all Control IQ icons and visual indicators including: control IQ jody icon, pump status icon, suspend bar, and activity icon.      Discussed activity features: sleep and exercise. Set up sleep schedules based on current sleep habits.     Control IQ turned ON today " "in clinic.      Patient held Lantus as directed .          INITIAL SETTINGS per Dr Luu:     Basal rate: 12a-12a=0.7 un/hr     Bolus settings :  ISF: 1:50  CHO RATIO: 1:13  Blood glucose goal: 110mg/dL  Active insulin time: 5 hrs     Max Bolus 10 units   Max basal preset in pump to = twice the basal profile but will not exceed 15 units   Auto off: off     Tandem T-slim 24 hour support line provided.    Patient instructed on setting up home tconnect account using " Getting Started Guide "   T:connect mobile giovanna synced with office account    Patient demonstrated the ability to fill and load t-slim cartridge, fill tubing, insert infusion set and fill canula adequately per sterile technique.  Patient inserted the infusion set with aseptic technique to right abdomen.   Reviewed site selection and rotation. Change cartridge, infusion set, and site every three days.   Discussed storage of insulin and back-up plan if pump is broken or fails.     Reviewed  treatment of hypoglycemia and hyperglycemia, and troubleshooting of pump.    Pt did very well with Control IQ pump instruction. We practiced several times on bolus for meal and correction. I stressed the importance of closed loop. Instructed on troubleshooting especially possibly changing the infusion site.               Diabetes Self Support Plan         Assessment Summary and Plan    Based on today's diabetes care assessment, the following areas of need were identified:                          Today's interventions were provided through individual discussion, instruction, and written materials were provided.      Patient verbalized understanding of instruction and written materials.  Pt was able to return back demonstration of instructions today. Patient understood key points, needs reinforcement and further instruction.     Diabetes Self-Management Care Plan:    Today's Diabetes Self-Management Care Plan was developed with Shirlene's input. Shirlene has agreed to work " toward the following goal(s) to improve his/her overall diabetes control.      Care Plan: Diabetes Management   Updates made since 4/25/2023 12:00 AM        Problem: Disease Process         Goal: Patient agrees to take steps toward understanding the diabetes disease process and treatment options by utilizing the Control IQ insulin pump    Start Date: 5/24/2023   Barriers: Knowledge deficit   Note:    Pt will understand the use of the Control IQ insulin pump to best control her blood sugars/diabetes       Task: Emphasized on the importance of controlling diabetes to prevent complications and reviewed both the short-term and long-term effects of uncontrolled diabetes. Completed 5/25/2023          Follow Up Plan     No follow-ups on file.    Today's care plan and follow up schedule was discussed with patient.  Shirlene verbalized understanding of the care plan, goals, and agrees to follow up plan.        The patient was encouraged to communicate with his/her health care provider/physician and care team regarding his/her condition(s) and treatment.  I provided the patient with my contact information today and encouraged to contact me via phone or [facility] Patient Portal as needed.     Length of Visit   Total Time: 60 Minutes

## 2023-05-27 ENCOUNTER — PATIENT MESSAGE (OUTPATIENT)
Dept: ENDOCRINOLOGY | Facility: CLINIC | Age: 28
End: 2023-05-27
Payer: OTHER GOVERNMENT

## 2023-06-08 ENCOUNTER — PATIENT OUTREACH (OUTPATIENT)
Dept: ADMINISTRATIVE | Facility: HOSPITAL | Age: 28
End: 2023-06-08
Payer: OTHER GOVERNMENT

## 2023-06-08 NOTE — PROGRESS NOTES
Chart reviewed, no Links immunization record noted.  No new results noted on [facility] or [facility] web site.  Care Everywhere updated.   Patient care coordination note  LOV with PCP 2/28/2023.  Left detailed message for patient to return call to discuss Cervical Cancer Screening and Eye Exam.

## 2023-06-14 ENCOUNTER — CLINICAL SUPPORT (OUTPATIENT)
Dept: DIABETES | Facility: CLINIC | Age: 28
End: 2023-06-14
Payer: OTHER GOVERNMENT

## 2023-06-14 DIAGNOSIS — E10.65 TYPE 1 DIABETES MELLITUS WITH HYPERGLYCEMIA: Primary | ICD-10-CM

## 2023-06-14 DIAGNOSIS — Z46.81 INSULIN PUMP TITRATION: ICD-10-CM

## 2023-06-14 PROCEDURE — G0108 DIAB MANAGE TRN  PER INDIV: HCPCS | Mod: PBBFAC

## 2023-06-14 NOTE — PROGRESS NOTES
"Diabetes Care Specialist Progress Note  Author: Blessing Coates RN  Date: 6/14/2023    Program Intake  Reason for Diabetes Program Visit:: Intervention  Type of Intervention:: Individual  Individual: Education, Device Training  Education: Self-Management Skill Review, Pattern Management  Device Training: Other  In the last 12 months, have you:: none    Lab Results   Component Value Date    HGBA1C 7.2 (H) 02/28/2023       Clinical       Pt came to the clinic today for a review of her Control IQ insulin pump. Pt states she tried to call the clinic for Dr Luu but never got a call back 2 weeks ago. Pt feels like her insulin settings are not enough and the blood sugar is not controlled. Pt states she changed the basal setttings and ISF to 40 herself. Changed from 12a-12p=0.7u/hr to 0.9u/hr. TIR from the download is 66% of the time pt is in range and 34% is high, 0% hypoglycemia. Pt gets very anxious about possible hypoglycemia., Pt is stopping the insulin when the blood sugar is going "low". Stressed the importance of "trusting" the Control IQ [auto-loop]to reduce or stop the Basal insulin. I asked pt is she has Glucagon available for severe hypoglycemia, pt states yes but she doesn't carry it with her. Encouraged pt to start carrying it with her.  Adjusted the basal settings: 12a-8a=1u/hr,8a-10:00p=0.95u/hr, 10:00p-12:00a=1u/hr. Dexcom G6 in place. I will f/u with pt later today.  Insulin pump download in Media                           Additional Social History                                    Diabetes Self-Management Skills Assessment                                              Diabetes Self Support Plan         Assessment Summary and Plan    Based on today's diabetes care assessment, the following areas of need were identified:                          Today's interventions were provided through individual discussion, instruction, and written materials were provided.      Patient verbalized understanding of " instruction and written materials.  Pt was able to return back demonstration of instructions today. Patient understood key points, needs reinforcement and further instruction.     Diabetes Self-Management Care Plan:    Today's Diabetes Self-Management Care Plan was developed with Shirlene's input. Shirlene has agreed to work toward the following goal(s) to improve his/her overall diabetes control.      Care Plan: Diabetes Management   Updates made since 5/15/2023 12:00 AM        Problem: Disease Process         Goal: Patient agrees to take steps toward understanding the diabetes disease process and treatment options by utilizing the Control IQ insulin pump    Start Date: 5/24/2023   Barriers: Knowledge deficit   Note:    Pt will understand the use of the Control IQ insulin pump to best control her blood sugars/diabetes       Task: Emphasized on the importance of controlling diabetes to prevent complications and reviewed both the short-term and long-term effects of uncontrolled diabetes. Completed 5/25/2023          Follow Up Plan     No follow-ups on file.    Today's care plan and follow up schedule was discussed with patient.  Shirlene verbalized understanding of the care plan, goals, and agrees to follow up plan.        The patient was encouraged to communicate with his/her health care provider/physician and care team regarding his/her condition(s) and treatment.  I provided the patient with my contact information today and encouraged to contact me via phone or [facility] Portal as needed.     Length of Visit   Total Time: 60 Minutes

## 2023-06-30 ENCOUNTER — LAB VISIT (OUTPATIENT)
Dept: LAB | Facility: HOSPITAL | Age: 28
End: 2023-06-30
Attending: STUDENT IN AN ORGANIZED HEALTH CARE EDUCATION/TRAINING PROGRAM
Payer: OTHER GOVERNMENT

## 2023-06-30 DIAGNOSIS — E10.65 TYPE 1 DIABETES MELLITUS WITH HYPERGLYCEMIA: ICD-10-CM

## 2023-06-30 LAB
ALBUMIN SERPL BCP-MCNC: 3.9 G/DL (ref 3.5–5.2)
ALBUMIN/CREAT UR: 5.4 UG/MG (ref 0–30)
ALP SERPL-CCNC: 59 U/L (ref 55–135)
ALT SERPL W/O P-5'-P-CCNC: 11 U/L (ref 10–44)
ANION GAP SERPL CALC-SCNC: 10 MMOL/L (ref 8–16)
AST SERPL-CCNC: 17 U/L (ref 10–40)
BILIRUB SERPL-MCNC: 0.6 MG/DL (ref 0.1–1)
BUN SERPL-MCNC: 24 MG/DL (ref 6–20)
CALCIUM SERPL-MCNC: 9.3 MG/DL (ref 8.7–10.5)
CHLORIDE SERPL-SCNC: 101 MMOL/L (ref 95–110)
CHOLEST SERPL-MCNC: 161 MG/DL (ref 120–199)
CHOLEST/HDLC SERPL: 3.3 {RATIO} (ref 2–5)
CO2 SERPL-SCNC: 25 MMOL/L (ref 23–29)
CREAT SERPL-MCNC: 0.8 MG/DL (ref 0.5–1.4)
CREAT UR-MCNC: 147.5 MG/DL (ref 15–325)
EST. GFR  (NO RACE VARIABLE): >60 ML/MIN/1.73 M^2
ESTIMATED AVG GLUCOSE: 157 MG/DL (ref 68–131)
GLUCOSE SERPL-MCNC: 159 MG/DL (ref 70–110)
HBA1C MFR BLD: 7.1 % (ref 4–5.6)
HDLC SERPL-MCNC: 49 MG/DL (ref 40–75)
HDLC SERPL: 30.4 % (ref 20–50)
LDLC SERPL CALC-MCNC: 96.8 MG/DL (ref 63–159)
MICROALBUMIN UR DL<=1MG/L-MCNC: 8 UG/ML
NONHDLC SERPL-MCNC: 112 MG/DL
POTASSIUM SERPL-SCNC: 4 MMOL/L (ref 3.5–5.1)
PROT SERPL-MCNC: 7.2 G/DL (ref 6–8.4)
SODIUM SERPL-SCNC: 136 MMOL/L (ref 136–145)
TRIGL SERPL-MCNC: 76 MG/DL (ref 30–150)
TSH SERPL DL<=0.005 MIU/L-ACNC: 1.24 UIU/ML (ref 0.4–4)

## 2023-06-30 PROCEDURE — 83036 HEMOGLOBIN GLYCOSYLATED A1C: CPT | Performed by: STUDENT IN AN ORGANIZED HEALTH CARE EDUCATION/TRAINING PROGRAM

## 2023-06-30 PROCEDURE — 80053 COMPREHEN METABOLIC PANEL: CPT | Performed by: STUDENT IN AN ORGANIZED HEALTH CARE EDUCATION/TRAINING PROGRAM

## 2023-06-30 PROCEDURE — 82570 ASSAY OF URINE CREATININE: CPT | Performed by: STUDENT IN AN ORGANIZED HEALTH CARE EDUCATION/TRAINING PROGRAM

## 2023-06-30 PROCEDURE — 84443 ASSAY THYROID STIM HORMONE: CPT | Performed by: STUDENT IN AN ORGANIZED HEALTH CARE EDUCATION/TRAINING PROGRAM

## 2023-06-30 PROCEDURE — 80061 LIPID PANEL: CPT | Performed by: STUDENT IN AN ORGANIZED HEALTH CARE EDUCATION/TRAINING PROGRAM

## 2023-06-30 PROCEDURE — 36415 COLL VENOUS BLD VENIPUNCTURE: CPT | Performed by: STUDENT IN AN ORGANIZED HEALTH CARE EDUCATION/TRAINING PROGRAM

## 2023-07-05 ENCOUNTER — OFFICE VISIT (OUTPATIENT)
Dept: ENDOCRINOLOGY | Facility: CLINIC | Age: 28
End: 2023-07-05
Payer: OTHER GOVERNMENT

## 2023-07-05 VITALS
DIASTOLIC BLOOD PRESSURE: 66 MMHG | HEIGHT: 63 IN | HEART RATE: 63 BPM | WEIGHT: 159 LBS | RESPIRATION RATE: 16 BRPM | SYSTOLIC BLOOD PRESSURE: 122 MMHG | BODY MASS INDEX: 28.17 KG/M2

## 2023-07-05 DIAGNOSIS — E10.65 TYPE 1 DIABETES MELLITUS WITH HYPERGLYCEMIA: Primary | ICD-10-CM

## 2023-07-05 DIAGNOSIS — E06.3 HASHIMOTO'S THYROIDITIS: ICD-10-CM

## 2023-07-05 DIAGNOSIS — Z96.41 INSULIN PUMP IN PLACE: ICD-10-CM

## 2023-07-05 PROCEDURE — 99999 PR PBB SHADOW E&M-EST. PATIENT-LVL III: CPT | Mod: PBBFAC,,, | Performed by: STUDENT IN AN ORGANIZED HEALTH CARE EDUCATION/TRAINING PROGRAM

## 2023-07-05 PROCEDURE — 99213 OFFICE O/P EST LOW 20 MIN: CPT | Mod: PBBFAC | Performed by: STUDENT IN AN ORGANIZED HEALTH CARE EDUCATION/TRAINING PROGRAM

## 2023-07-05 PROCEDURE — 99999 PR PBB SHADOW E&M-EST. PATIENT-LVL III: ICD-10-PCS | Mod: PBBFAC,,, | Performed by: STUDENT IN AN ORGANIZED HEALTH CARE EDUCATION/TRAINING PROGRAM

## 2023-07-05 PROCEDURE — 99214 PR OFFICE/OUTPT VISIT, EST, LEVL IV, 30-39 MIN: ICD-10-PCS | Mod: 25,S$PBB,, | Performed by: STUDENT IN AN ORGANIZED HEALTH CARE EDUCATION/TRAINING PROGRAM

## 2023-07-05 PROCEDURE — 99214 OFFICE O/P EST MOD 30 MIN: CPT | Mod: 25,S$PBB,, | Performed by: STUDENT IN AN ORGANIZED HEALTH CARE EDUCATION/TRAINING PROGRAM

## 2023-07-05 RX ORDER — LEVOTHYROXINE SODIUM 100 UG/1
100 TABLET ORAL
Qty: 90 TABLET | Refills: 3 | Status: SHIPPED | OUTPATIENT
Start: 2023-07-05

## 2023-07-05 RX ORDER — LEVOTHYROXINE SODIUM 100 UG/1
100 TABLET ORAL
COMMUNITY
Start: 2023-06-15 | End: 2023-07-05

## 2023-07-05 NOTE — PROGRESS NOTES
Subjective:      Patient ID: Shirlene Shook is a 27 y.o. female.    Chief Complaint:  Type 1 diabetes mellitus    History of Present Illness  This is a 27 y.o. female. with a past medical history of Type 1 diabetes mellitus, Hashimoto's thyroiditis here for evaluation.    Type 1 diabetes mellitus  Diagnosed around age 17    Current diabetes medications:  - Insulin Novolog via Tandem tslim X2 insulin pump    Control IQ: ON    Basal rate  12A: 1.0 U/h    ICR  12A: 10    ISF  12A: 39    Target: 110  IAT: 5h      Past diabetes medications:  - Classic Omnipod  - Lantus    Lab Results   Component Value Date    CREATININE 0.8 06/30/2023    EGFRNORACEVR >60 06/30/2023       Known diabetic complications: none    Weight trend:  Wt Readings from Last 6 Encounters:   07/05/23 72.1 kg (159 lb)   04/12/23 68 kg (149 lb 14.6 oz)   02/28/23 70.5 kg (155 lb 8.6 oz)   09/07/22 65.5 kg (144 lb 6.4 oz)   08/02/22 65.8 kg (144 lb 15.2 oz)         Prior visit with diabetes education: yes    Current diet: Around 30 g carbs per day  Physical activity: Running, biking with children    Blood glucose monitoring at home: See download on media    She has 2 children   had vasectomy      Diabetes Management Status  Statin: Not taking  ACE/ARB: Not taking    Screening or Prevention Patient's value   HgA1C Testing and Control   Lab Results   Component Value Date    HGBA1C 7.1 (H) 06/30/2023        LDL control Lab Results   Component Value Date    LDLCALC 96.8 06/30/2023      Nephropathy screening Lab Results   Component Value Date    MICALBCREAT 5.4 06/30/2023        Last eye exam: Most Recent Eye Exam Date: Not Found          Hypothyroidism  Currently on levothyroxine 100 mcg daily  Reports takes 30 min before eating or drinking anything other than water.   Reports taking separate from multivitamins by at least 4 hours    Lab Results   Component Value Date    TSH 1.238 06/30/2023         Review of Systems  As above    Social and family  "history reviewed  Current medications and allergies reviewed    Objective:   /66 (BP Location: Right arm, Patient Position: Sitting, BP Method: Medium (Manual))   Pulse 63   Resp 16   Ht 5' 3" (1.6 m)   Wt 72.1 kg (159 lb)   BMI 28.17 kg/m²   Physical Exam  Alert, oriented    BP Readings from Last 1 Encounters:   07/05/23 122/66      Wt Readings from Last 1 Encounters:   07/05/23 1031 72.1 kg (159 lb)     Body mass index is 28.17 kg/m².    Lab Review:   Lab Results   Component Value Date    HGBA1C 7.1 (H) 06/30/2023     Lab Results   Component Value Date    CHOL 161 06/30/2023    HDL 49 06/30/2023    LDLCALC 96.8 06/30/2023    TRIG 76 06/30/2023    CHOLHDL 30.4 06/30/2023     Lab Results   Component Value Date     06/30/2023    K 4.0 06/30/2023     06/30/2023    CO2 25 06/30/2023     (H) 06/30/2023    BUN 24 (H) 06/30/2023    CREATININE 0.8 06/30/2023    CALCIUM 9.3 06/30/2023    PROT 7.2 06/30/2023    ALBUMIN 3.9 06/30/2023    BILITOT 0.6 06/30/2023    ALKPHOS 59 06/30/2023    AST 17 06/30/2023    ALT 11 06/30/2023    ANIONGAP 10 06/30/2023    TSH 1.238 06/30/2023       All pertinent labs reviewed    Assessment and Plan     Type 1 diabetes mellitus with hyperglycemia  Control has improved with use of insulin pump with closed loop communication system. Will continue current settings.    Plan  - Continue insulin Novolog via Tandem tslim X2 insulin pump    Basal rate  12A: 1.0 U/h    ICR  12A: 10    ISF  12A: 39    Target: 130  IAT: 5h    F/u 4 months with labs    Insulin pump in place  Continue Tandem tslim X2 insulin pump    Hashimoto's thyroiditis  Last TSH at goal for age  Continue levothyroxine 100 mcg daily        Follow-up in 4 months    Javed Luu MD  Endocrinology      "

## 2023-07-10 ENCOUNTER — PATIENT MESSAGE (OUTPATIENT)
Dept: ADMINISTRATIVE | Facility: HOSPITAL | Age: 28
End: 2023-07-10
Payer: OTHER GOVERNMENT

## 2023-07-11 ENCOUNTER — PATIENT MESSAGE (OUTPATIENT)
Dept: ADMINISTRATIVE | Facility: HOSPITAL | Age: 28
End: 2023-07-11
Payer: OTHER GOVERNMENT

## 2023-07-25 ENCOUNTER — PATIENT OUTREACH (OUTPATIENT)
Dept: ADMINISTRATIVE | Facility: HOSPITAL | Age: 28
End: 2023-07-25
Payer: OTHER GOVERNMENT

## 2023-07-25 NOTE — LETTER
AUTHORIZATION FOR RELEASE OF   CONFIDENTIAL INFORMATION      We are seeing Shirlene Shook, date of birth 1995, in the clinic at [facility]. Leanne Harrell MD is the patient's PCP. Shirlene Shook has an outstanding lab/procedure at the time we reviewed her chart. In order to help keep her health information updated, she has authorized us to request the following medical record(s):        (  )  MAMMOGRAM                                      (  )  COLONOSCOPY      (  )  PAP SMEAR                                          (  )  OUTSIDE LAB RESULTS     (  )  DEXA SCAN                                          ( XX )  EYE EXAM            (  )  FOOT EXAM                                          (  )  ENTIRE RECORD     (  )  OUTSIDE IMMUNIZATIONS                 (  )  _______________         Please fax records to [facility], Hartman, Megan M., MD, [phone]    Patient Name: Shirlene Shook  : 1995  Patient Phone #: [phone]

## 2023-07-25 NOTE — PROGRESS NOTES
Chart reviewed, no Links immunization record updated.  No new results noted on [facility] or [facility] web site.  Care Everywhere updated.   Patient care coordination note  LOV with PCP 2/28/2023.  Discussed Cervical Cancer with patient, patient states she will research providers and call back to schedule.   Patient states she had Eye Exam with [facility] [place] in [facility] sent for eye exam.

## 2023-07-25 NOTE — LETTER
AUTHORIZATION FOR RELEASE OF   CONFIDENTIAL INFORMATION    Dear [facility] [place],    We are seeing Shirlene Shook, date of birth 1995, in the clinic at [facility]. Leanne Harrell MD is the patient's PCP. Shirlene Shook has an outstanding lab/procedure at the time we reviewed her chart. In order to help keep her health information updated, she has authorized us to request the following medical record(s):         ( X )  EYE EXAM  (Diabetic Eye Exam)            Please fax records to [facility], Megan M Hartman, MD Laura Rogers, LPN  Clinical Care Coordinator  [facility]  Phone: [phone]  Fax: [phone]            Patient Name: Shirlene Shook  : 1995  Patient Phone #: [phone]

## 2023-08-09 ENCOUNTER — PATIENT OUTREACH (OUTPATIENT)
Dept: ADMINISTRATIVE | Facility: HOSPITAL | Age: 28
End: 2023-08-09
Payer: OTHER GOVERNMENT

## 2023-09-18 ENCOUNTER — PATIENT OUTREACH (OUTPATIENT)
Dept: ADMINISTRATIVE | Facility: HOSPITAL | Age: 28
End: 2023-09-18
Payer: OTHER GOVERNMENT

## 2023-10-27 ENCOUNTER — PATIENT OUTREACH (OUTPATIENT)
Dept: ADMINISTRATIVE | Facility: HOSPITAL | Age: 28
End: 2023-10-27
Payer: OTHER GOVERNMENT

## 2023-10-27 NOTE — PROGRESS NOTES
Chart reviewed, no Links immunization record noted.  No new results noted on [facility] or [facility] web site.  Care Everywhere updated.   Patient care coordination note  LOV with PCP 2/28/2023.  Left detailed message for patient to return call to discuss Eye Exam and Cervical Cancer Screening.

## 2023-11-16 ENCOUNTER — LAB VISIT (OUTPATIENT)
Dept: LAB | Facility: HOSPITAL | Age: 28
End: 2023-11-16
Attending: STUDENT IN AN ORGANIZED HEALTH CARE EDUCATION/TRAINING PROGRAM
Payer: OTHER GOVERNMENT

## 2023-11-16 DIAGNOSIS — E10.65 TYPE 1 DIABETES MELLITUS WITH HYPERGLYCEMIA: ICD-10-CM

## 2023-11-16 LAB
ALBUMIN SERPL BCP-MCNC: 3.9 G/DL (ref 3.5–5.2)
ALP SERPL-CCNC: 73 U/L (ref 55–135)
ALT SERPL W/O P-5'-P-CCNC: 26 U/L (ref 10–44)
ANION GAP SERPL CALC-SCNC: 9 MMOL/L (ref 8–16)
AST SERPL-CCNC: 31 U/L (ref 10–40)
BILIRUB SERPL-MCNC: 0.4 MG/DL (ref 0.1–1)
BUN SERPL-MCNC: 10 MG/DL (ref 6–20)
CALCIUM SERPL-MCNC: 9.3 MG/DL (ref 8.7–10.5)
CHLORIDE SERPL-SCNC: 101 MMOL/L (ref 95–110)
CO2 SERPL-SCNC: 28 MMOL/L (ref 23–29)
CREAT SERPL-MCNC: 0.8 MG/DL (ref 0.5–1.4)
EST. GFR  (NO RACE VARIABLE): >60 ML/MIN/1.73 M^2
ESTIMATED AVG GLUCOSE: 148 MG/DL (ref 68–131)
GLUCOSE SERPL-MCNC: 131 MG/DL (ref 70–110)
HBA1C MFR BLD: 6.8 % (ref 4–5.6)
POTASSIUM SERPL-SCNC: 3.8 MMOL/L (ref 3.5–5.1)
PROT SERPL-MCNC: 7.4 G/DL (ref 6–8.4)
SODIUM SERPL-SCNC: 138 MMOL/L (ref 136–145)
TSH SERPL DL<=0.005 MIU/L-ACNC: 0.5 UIU/ML (ref 0.4–4)

## 2023-11-16 PROCEDURE — 84443 ASSAY THYROID STIM HORMONE: CPT | Performed by: STUDENT IN AN ORGANIZED HEALTH CARE EDUCATION/TRAINING PROGRAM

## 2023-11-16 PROCEDURE — 80053 COMPREHEN METABOLIC PANEL: CPT | Performed by: STUDENT IN AN ORGANIZED HEALTH CARE EDUCATION/TRAINING PROGRAM

## 2023-11-16 PROCEDURE — 83036 HEMOGLOBIN GLYCOSYLATED A1C: CPT | Performed by: STUDENT IN AN ORGANIZED HEALTH CARE EDUCATION/TRAINING PROGRAM

## 2023-11-16 PROCEDURE — 36415 COLL VENOUS BLD VENIPUNCTURE: CPT | Performed by: STUDENT IN AN ORGANIZED HEALTH CARE EDUCATION/TRAINING PROGRAM

## 2023-11-20 ENCOUNTER — OFFICE VISIT (OUTPATIENT)
Dept: ENDOCRINOLOGY | Facility: CLINIC | Age: 28
End: 2023-11-20
Payer: OTHER GOVERNMENT

## 2023-11-20 VITALS
DIASTOLIC BLOOD PRESSURE: 68 MMHG | WEIGHT: 163 LBS | SYSTOLIC BLOOD PRESSURE: 123 MMHG | HEIGHT: 63 IN | BODY MASS INDEX: 28.88 KG/M2 | HEART RATE: 78 BPM

## 2023-11-20 DIAGNOSIS — Z96.41 INSULIN PUMP IN PLACE: ICD-10-CM

## 2023-11-20 DIAGNOSIS — E10.65 TYPE 1 DIABETES MELLITUS WITH HYPERGLYCEMIA: Primary | ICD-10-CM

## 2023-11-20 DIAGNOSIS — E06.3 HASHIMOTO'S THYROIDITIS: ICD-10-CM

## 2023-11-20 PROCEDURE — 95251 PR GLUCOSE MONITOR, 72 HOUR, PHYS INTERP: ICD-10-PCS | Mod: ,,, | Performed by: STUDENT IN AN ORGANIZED HEALTH CARE EDUCATION/TRAINING PROGRAM

## 2023-11-20 PROCEDURE — 99214 PR OFFICE/OUTPT VISIT, EST, LEVL IV, 30-39 MIN: ICD-10-PCS | Mod: 25,S$PBB,, | Performed by: STUDENT IN AN ORGANIZED HEALTH CARE EDUCATION/TRAINING PROGRAM

## 2023-11-20 PROCEDURE — 99999 PR PBB SHADOW E&M-EST. PATIENT-LVL III: CPT | Mod: PBBFAC,,, | Performed by: STUDENT IN AN ORGANIZED HEALTH CARE EDUCATION/TRAINING PROGRAM

## 2023-11-20 PROCEDURE — 95251 CONT GLUC MNTR ANALYSIS I&R: CPT | Mod: ,,, | Performed by: STUDENT IN AN ORGANIZED HEALTH CARE EDUCATION/TRAINING PROGRAM

## 2023-11-20 PROCEDURE — 99999 PR PBB SHADOW E&M-EST. PATIENT-LVL III: ICD-10-PCS | Mod: PBBFAC,,, | Performed by: STUDENT IN AN ORGANIZED HEALTH CARE EDUCATION/TRAINING PROGRAM

## 2023-11-20 PROCEDURE — 99213 OFFICE O/P EST LOW 20 MIN: CPT | Mod: PBBFAC | Performed by: STUDENT IN AN ORGANIZED HEALTH CARE EDUCATION/TRAINING PROGRAM

## 2023-11-20 PROCEDURE — 99214 OFFICE O/P EST MOD 30 MIN: CPT | Mod: 25,S$PBB,, | Performed by: STUDENT IN AN ORGANIZED HEALTH CARE EDUCATION/TRAINING PROGRAM

## 2023-11-20 RX ORDER — GLUCAGON 3 MG/1
POWDER NASAL
Qty: 2 EACH | Refills: 0 | Status: SHIPPED | OUTPATIENT
Start: 2023-11-20

## 2023-11-20 NOTE — PROGRESS NOTES
Subjective:      Patient ID: Shirlene Shook is a 28 y.o. female.    Chief Complaint:  Type 1 diabetes mellitus    History of Present Illness  This is a 28 y.o. female. with a past medical history of Type 1 diabetes mellitus, Hashimoto's thyroiditis here for evaluation.    Type 1 diabetes mellitus  Diagnosed around age 17    Current diabetes medications:  - Insulin Novolog via Tandem tslim X2 insulin pump    Control IQ: ON    Basal rate  12A: 1.0 U/h    ICR  12A: 10    ISF  12A: 39  8A :  37      Target: 110  IAT: 5h      TDD 40  Basal 45%  Bolus 55%      Past diabetes medications:  - Classic Omnipod  - Lantus    Lab Results   Component Value Date    CREATININE 0.8 11/16/2023    EGFRNORACEVR >60 11/16/2023       Known diabetic complications: none    Weight trend:  Wt Readings from Last 6 Encounters:   11/20/23 73.9 kg (163 lb)   07/05/23 72.1 kg (159 lb)   04/12/23 68 kg (149 lb 14.6 oz)   02/28/23 70.5 kg (155 lb 8.6 oz)   09/07/22 65.5 kg (144 lb 6.4 oz)   08/02/22 65.8 kg (144 lb 15.2 oz)         Prior visit with diabetes education: yes    Current diet: Around 30 g carbs per day  Physical activity: Running, biking with children    Blood glucose monitoring at home: See download on media    She has 2 children   had vasectomy      Diabetes Management Status  Statin: Not taking  ACE/ARB: Not taking    Screening or Prevention Patient's value   HgA1C Testing and Control   Lab Results   Component Value Date    HGBA1C 6.8 (H) 11/16/2023        LDL control Lab Results   Component Value Date    LDLCALC 96.8 06/30/2023      Nephropathy screening Lab Results   Component Value Date    MICALBCREAT 5.4 06/30/2023        Last eye exam: : 08/17/2022          Hypothyroidism  Currently on levothyroxine 100 mcg daily  Reports takes 30 min before eating or drinking anything other than water.   Reports taking separate from multivitamins by at least 4 hours    Lab Results   Component Value Date    TSH 0.504 11/16/2023  "        Review of Systems  As above    Social and family history reviewed  Current medications and allergies reviewed    Objective:   /68 (BP Location: Right arm, Patient Position: Sitting, BP Method: Medium (Manual))   Pulse 78   Ht 5' 3" (1.6 m)   Wt 73.9 kg (163 lb)   BMI 28.87 kg/m²   Physical Exam  Alert, oriented    BP Readings from Last 1 Encounters:   11/20/23 123/68      Wt Readings from Last 1 Encounters:   11/20/23 0821 73.9 kg (163 lb)     Body mass index is 28.87 kg/m².    Lab Review:   Lab Results   Component Value Date    HGBA1C 6.8 (H) 11/16/2023     Lab Results   Component Value Date    CHOL 161 06/30/2023    HDL 49 06/30/2023    LDLCALC 96.8 06/30/2023    TRIG 76 06/30/2023    CHOLHDL 30.4 06/30/2023     Lab Results   Component Value Date     11/16/2023    K 3.8 11/16/2023     11/16/2023    CO2 28 11/16/2023     (H) 11/16/2023    BUN 10 11/16/2023    CREATININE 0.8 11/16/2023    CALCIUM 9.3 11/16/2023    PROT 7.4 11/16/2023    ALBUMIN 3.9 11/16/2023    BILITOT 0.4 11/16/2023    ALKPHOS 73 11/16/2023    AST 31 11/16/2023    ALT 26 11/16/2023    ANIONGAP 9 11/16/2023    TSH 0.504 11/16/2023       All pertinent labs reviewed    Assessment and Plan     Type 1 diabetes mellitus with hyperglycemia  Reviewed CGM. TIR is ~60% which is below goal for her age. Noticed a lot of prandial hyperglycemia despite counting carbs appropriately - will adjust ICR. Will also adjust sensitivity as corrections boluses do not bring glucose to goal.    Plan  - Continue insulin Novolog via Tandem tslim X2 insulin pump    12A: 1.0 U/h    ICR  12A: 10  8A: 9    ISF  12A: 30      Target: 110  IAT: 5h    F/u 4 months with labs    Insulin pump in place  Continue Tandem tslim X2 insulin pump    Hashimoto's thyroiditis  Last TSH at goal for age  Continue levothyroxine 100 mcg daily          Follow-up in 4 months    Javed Luu MD  Endocrinology      "

## 2023-11-20 NOTE — ASSESSMENT & PLAN NOTE
Reviewed CGM. TIR is ~60% which is below goal for her age. Noticed a lot of prandial hyperglycemia despite counting carbs appropriately - will adjust ICR. Will also adjust sensitivity as corrections boluses do not bring glucose to goal.    Plan  - Continue insulin Novolog via Tandem tslim X2 insulin pump    12A: 1.0 U/h    ICR  12A: 10  8A: 9    ISF  12A: 30      Target: 110  IAT: 5h    F/u 4 months with labs

## 2023-12-14 ENCOUNTER — PATIENT OUTREACH (OUTPATIENT)
Dept: ADMINISTRATIVE | Facility: HOSPITAL | Age: 28
End: 2023-12-14
Payer: OTHER GOVERNMENT

## 2023-12-14 NOTE — PROGRESS NOTES
[place] Cervical Cancer Screening Gap Report.  Chart reviewed, immunization record updated.  No new results noted on [facility] or [facility] web site.  Care Everywhere updated.   Patient care coordination note  LOV with PCP 2/28/2023.  Left detailed message for patient to return call to discuss Eye Exam, Cervical Cancer Screening and schedule routine PCP visit.

## 2024-01-03 DIAGNOSIS — E11.9 TYPE 2 DIABETES MELLITUS WITHOUT COMPLICATION, UNSPECIFIED WHETHER LONG TERM INSULIN USE: ICD-10-CM

## 2024-01-24 ENCOUNTER — OFFICE VISIT (OUTPATIENT)
Dept: FAMILY MEDICINE | Facility: CLINIC | Age: 29
End: 2024-01-24
Payer: OTHER GOVERNMENT

## 2024-01-24 VITALS
WEIGHT: 166.13 LBS | HEART RATE: 76 BPM | RESPIRATION RATE: 16 BRPM | DIASTOLIC BLOOD PRESSURE: 64 MMHG | HEIGHT: 61 IN | BODY MASS INDEX: 31.37 KG/M2 | SYSTOLIC BLOOD PRESSURE: 98 MMHG

## 2024-01-24 DIAGNOSIS — F41.1 GAD (GENERALIZED ANXIETY DISORDER): ICD-10-CM

## 2024-01-24 DIAGNOSIS — E10.9 TYPE 1 DIABETES MELLITUS WITHOUT COMPLICATION: ICD-10-CM

## 2024-01-24 DIAGNOSIS — Z01.419 ENCOUNTER FOR GYNECOLOGICAL EXAMINATION WITHOUT ABNORMAL FINDING: Primary | ICD-10-CM

## 2024-01-24 PROCEDURE — 99214 OFFICE O/P EST MOD 30 MIN: CPT | Mod: PBBFAC | Performed by: FAMILY MEDICINE

## 2024-01-24 PROCEDURE — 99999 PR PBB SHADOW E&M-EST. PATIENT-LVL IV: CPT | Mod: PBBFAC,,, | Performed by: FAMILY MEDICINE

## 2024-01-24 PROCEDURE — 99213 OFFICE O/P EST LOW 20 MIN: CPT | Mod: S$PBB,,, | Performed by: FAMILY MEDICINE

## 2024-01-24 RX ORDER — CITALOPRAM 10 MG/1
10 TABLET ORAL DAILY
Qty: 30 TABLET | Refills: 11 | Status: SHIPPED | OUTPATIENT
Start: 2024-01-24 | End: 2024-02-27 | Stop reason: SDUPTHER

## 2024-01-24 RX ORDER — CITALOPRAM 10 MG/1
10 TABLET ORAL DAILY
Qty: 30 TABLET | Refills: 11 | Status: SHIPPED | OUTPATIENT
Start: 2024-01-24 | End: 2024-01-24 | Stop reason: SDUPTHER

## 2024-01-24 NOTE — PROGRESS NOTES
Subjective:       Patient ID: Shirlene Shook is a 28 y.o. female.    Chief Complaint: Anxiety (Patient having trouble with panic attacks)    HPI  28 year old female with t1dm comes in after stopping her buspar. She feels that her anxiety was being fed by her meds and she had gained weight secondary to the medicine itself. She has stopped them and feels that she is overwhelmed regularly. Aside from buspar, she has taken xanax but nothing ever on a regular basis.    PMH, PSH, ALLERGIES, SH, FH reviewed in nurse's notes above  Medications reconciled in the nurse's notes      Review of Systems   Constitutional:  Positive for unexpected weight change. Negative for chills and fever.   HENT:  Negative for congestion, ear pain, postnasal drip, rhinorrhea, sore throat and trouble swallowing.    Eyes:  Negative for redness and itching.   Respiratory:  Negative for cough, shortness of breath and wheezing.    Cardiovascular:  Negative for chest pain and palpitations.   Gastrointestinal:  Negative for abdominal pain, diarrhea, nausea and vomiting.   Genitourinary:  Negative for dysuria and frequency.   Skin:  Negative for rash.   Neurological:  Negative for weakness and headaches.   Psychiatric/Behavioral:  The patient is nervous/anxious.        Objective:      Physical Exam  Vitals and nursing note reviewed.   Constitutional:       General: She is not in acute distress.     Appearance: She is well-developed. She is obese.   HENT:      Head: Normocephalic and atraumatic.   Eyes:      Conjunctiva/sclera: Conjunctivae normal.      Pupils: Pupils are equal, round, and reactive to light.   Neck:      Thyroid: No thyromegaly.   Cardiovascular:      Rate and Rhythm: Normal rate and regular rhythm.      Heart sounds: Normal heart sounds.   Pulmonary:      Effort: Pulmonary effort is normal. No respiratory distress.      Breath sounds: Normal breath sounds. No wheezing.   Abdominal:      General: Bowel sounds are normal.       Palpations: Abdomen is soft.      Tenderness: There is no abdominal tenderness.   Musculoskeletal:         General: Normal range of motion.      Cervical back: Normal range of motion and neck supple.   Lymphadenopathy:      Cervical: No cervical adenopathy.   Skin:     General: Skin is warm and dry.      Findings: No rash.   Neurological:      Mental Status: She is alert and oriented to person, place, and time.   Psychiatric:         Behavior: Behavior normal.          Assessment/Plan:       Problem List Items Addressed This Visit          Psychiatric    TEE (generalized anxiety disorder)    Relevant Medications    citalopram (CELEXA) 10 MG tablet     Other Visit Diagnoses       Encounter for gynecological examination without abnormal finding    -  Primary    Relevant Orders    Ambulatory referral/consult to Obstetrics / Gynecology    Type 1 diabetes mellitus without complication        Relevant Orders    Ambulatory referral/consult to Endocrinology          RTC if condition acutely worsens or any other concerns, otherwise RTC as scheduled 2-3 weeks

## 2024-01-24 NOTE — TELEPHONE ENCOUNTER
Patient is needing medication to go to [facility] not [facility]     Rx pending with updated pharmacy

## 2024-01-24 NOTE — TELEPHONE ENCOUNTER
No care due was identified.  [facility] Embedded Care Due Messages. Reference number: [id number].   1/24/2024 9:56:36 AM CST

## 2024-01-25 ENCOUNTER — TELEPHONE (OUTPATIENT)
Dept: OBSTETRICS AND GYNECOLOGY | Facility: CLINIC | Age: 29
End: 2024-01-25
Payer: OTHER GOVERNMENT

## 2024-01-25 NOTE — TELEPHONE ENCOUNTER
Received referral to schedule annual with Dr. Mills. Attempted to contact. LM on voicemail to call clinic.

## 2024-02-01 ENCOUNTER — PATIENT MESSAGE (OUTPATIENT)
Dept: FAMILY MEDICINE | Facility: CLINIC | Age: 29
End: 2024-02-01
Payer: OTHER GOVERNMENT

## 2024-02-27 ENCOUNTER — OFFICE VISIT (OUTPATIENT)
Dept: FAMILY MEDICINE | Facility: CLINIC | Age: 29
End: 2024-02-27
Payer: OTHER GOVERNMENT

## 2024-02-27 VITALS
BODY MASS INDEX: 29.43 KG/M2 | DIASTOLIC BLOOD PRESSURE: 86 MMHG | HEART RATE: 72 BPM | SYSTOLIC BLOOD PRESSURE: 122 MMHG | WEIGHT: 155.88 LBS | RESPIRATION RATE: 16 BRPM | HEIGHT: 61 IN

## 2024-02-27 DIAGNOSIS — F41.1 GAD (GENERALIZED ANXIETY DISORDER): Primary | ICD-10-CM

## 2024-02-27 PROCEDURE — 99213 OFFICE O/P EST LOW 20 MIN: CPT | Mod: PBBFAC | Performed by: FAMILY MEDICINE

## 2024-02-27 PROCEDURE — 99999 PR PBB SHADOW E&M-EST. PATIENT-LVL III: CPT | Mod: PBBFAC,,, | Performed by: FAMILY MEDICINE

## 2024-02-27 PROCEDURE — 99213 OFFICE O/P EST LOW 20 MIN: CPT | Mod: S$PBB,,, | Performed by: FAMILY MEDICINE

## 2024-02-27 RX ORDER — CITALOPRAM 10 MG/1
10 TABLET ORAL DAILY
Qty: 90 TABLET | Refills: 3 | Status: SHIPPED | OUTPATIENT
Start: 2024-02-27 | End: 2025-02-26

## 2024-02-27 RX ORDER — CITALOPRAM 10 MG/1
10 TABLET ORAL DAILY
Qty: 30 TABLET | Refills: 11 | Status: SHIPPED | OUTPATIENT
Start: 2024-02-27 | End: 2024-02-27 | Stop reason: SDUPTHER

## 2024-02-27 NOTE — PROGRESS NOTES
Subjective:       Patient ID: Shirlene Shook is a 28 y.o. female.    Chief Complaint: Follow-up (1 month follow up)    History of Present Illness  The patient presents for evaluation of treatment for maribel. She was previously on buspar, but we switched to celexa.    She denies any adjustments to her insulin since being on the Celexa. Her blood sugar was high last night because she changed her insulin pump before she went to bed and forgot to turn it back on. She did not take any insulin after dinner last night. Her lowest blood sugar was 80. She plans to lose 5 to 10 more pounds and understands how to adjust her insulin if needed. She has a gym membership and plans to start working out.    She has acne that started right after she started taking Celexa. When she was on BuSpar, she had brain zaps, pain, and dizziness. She denies any of this on her current meds. She is happy.    PMH, PSH, ALLERGIES, SH, FH reviewed in nurse's notes above  Medications reconciled in the nurse's notes      Objective:      Physical Exam        Physical Exam  Vitals and nursing note reviewed.   Constitutional:       General: She is not in acute distress.     Appearance: She is well-developed.   HENT:      Head: Normocephalic and atraumatic.   Eyes:      Conjunctiva/sclera: Conjunctivae normal.      Pupils: Pupils are equal, round, and reactive to light.   Neck:      Thyroid: No thyromegaly.   Cardiovascular:      Rate and Rhythm: Normal rate and regular rhythm.      Heart sounds: Normal heart sounds.   Pulmonary:      Effort: Pulmonary effort is normal. No respiratory distress.      Breath sounds: Normal breath sounds. No wheezing.   Abdominal:      General: Bowel sounds are normal.      Palpations: Abdomen is soft.      Tenderness: There is no abdominal tenderness.   Musculoskeletal:         General: Normal range of motion.      Cervical back: Normal range of motion and neck supple.   Lymphadenopathy:      Cervical: No cervical  adenopathy.   Skin:     General: Skin is warm and dry.      Findings: No rash.      Comments: Minimal papular acne to forehead   Neurological:      Mental Status: She is alert and oriented to person, place, and time.   Psychiatric:         Behavior: Behavior normal.         Results    Assessment:           1. TEE (generalized anxiety disorder)        Plan:     Assessment & Plan  1. Anxiety.  A 90-day prescription for Celexa was provided.    Shirlene was seen today for follow-up.    Diagnoses and all orders for this visit:    TEE (generalized anxiety disorder)  -     citalopram (CELEXA) 10 MG tablet; Take 1 tablet (10 mg total) by mouth once daily.    Other orders  -     Discontinue: citalopram (CELEXA) 10 MG tablet; Take 1 tablet (10 mg total) by mouth once daily.          RTC if condition acutely worsens or any other concerns, otherwise RTC as scheduled      This note was generated with the assistance of ambient listening technology. Verbal consent was obtained by the patient and accompanying visitor(s) for the recording of patient appointment to facilitate this note. I attest to having reviewed and edited the generated note for accuracy, though some syntax or spelling errors may persist. Please contact the author of this note for any clarification.

## 2024-04-09 ENCOUNTER — PATIENT OUTREACH (OUTPATIENT)
Dept: ADMINISTRATIVE | Facility: HOSPITAL | Age: 29
End: 2024-04-09
Payer: OTHER GOVERNMENT

## 2024-04-09 NOTE — PROGRESS NOTES
[facility] Cervical Cancer Screening Gap Report.  Chart reviewed, no Links immunization record noted.  No new results noted on [facility] or [facility] web site.  Care Everywhere updated.   Patient care coordination note  LOV with PCP 2/27/2024.  Discussed Cervical Cancer Screening, patient states she is not up to date on pap smear but will call and schedule with her GYN.  Patient states she is not up to date on eye exam but will also call to schedule soon.

## 2024-05-09 ENCOUNTER — PATIENT MESSAGE (OUTPATIENT)
Dept: ENDOCRINOLOGY | Facility: CLINIC | Age: 29
End: 2024-05-09
Payer: OTHER GOVERNMENT

## 2024-05-15 ENCOUNTER — TELEPHONE (OUTPATIENT)
Dept: FAMILY MEDICINE | Facility: CLINIC | Age: 29
End: 2024-05-15
Payer: OTHER GOVERNMENT

## 2024-05-15 NOTE — TELEPHONE ENCOUNTER
----- Message from Jonny Smith sent at 5/15/2024 10:39 AM CDT -----  Contact: Patient  Shirlene Shook  MRN: [id number]  : 1995  PCP: Leanne Harrell  Home Phone      [phone]  Work Phone      Not on file.  Mobile          [phone]      MESSAGE: diabetic - cut foot on broken glass -- requesting to speak with nurse    Call [phone]    PCP: Sharri

## 2024-05-15 NOTE — TELEPHONE ENCOUNTER
Spoke with patient and she states that her  dropped a glass bowl last night and she thought she picked everything up but if having a pain in her foot like a piece of glass is stuck in it. She went to the  and was turned away stating they couldn't do anything about that.  Patient scheduled appointment next day with PCP, patient confirmed.

## 2024-05-16 ENCOUNTER — OFFICE VISIT (OUTPATIENT)
Dept: FAMILY MEDICINE | Facility: CLINIC | Age: 29
End: 2024-05-16
Payer: OTHER GOVERNMENT

## 2024-05-16 VITALS
SYSTOLIC BLOOD PRESSURE: 104 MMHG | RESPIRATION RATE: 16 BRPM | HEIGHT: 61 IN | OXYGEN SATURATION: 98 % | HEART RATE: 74 BPM | DIASTOLIC BLOOD PRESSURE: 72 MMHG | WEIGHT: 169 LBS | BODY MASS INDEX: 31.91 KG/M2

## 2024-05-16 DIAGNOSIS — M79.5 FOREIGN BODY (FB) IN SOFT TISSUE: Primary | ICD-10-CM

## 2024-05-16 DIAGNOSIS — E10.65 TYPE 1 DIABETES MELLITUS WITH HYPERGLYCEMIA: ICD-10-CM

## 2024-05-16 PROCEDURE — 99999 PR PBB SHADOW E&M-EST. PATIENT-LVL III: CPT | Mod: PBBFAC,,, | Performed by: FAMILY MEDICINE

## 2024-05-16 PROCEDURE — 99213 OFFICE O/P EST LOW 20 MIN: CPT | Mod: S$PBB,,, | Performed by: FAMILY MEDICINE

## 2024-05-16 PROCEDURE — 99213 OFFICE O/P EST LOW 20 MIN: CPT | Mod: PBBFAC | Performed by: FAMILY MEDICINE

## 2024-05-16 NOTE — PROGRESS NOTES
Subjective:       Patient ID: Shirlene Shook is a 28 y.o. female.    Chief Complaint: Foot Injury (Foreign body in left foot (glass))    History of Present Illness  The patient presents for evaluation of foot injury. She is accompanied by her mother.    The patient sustained an injury to her foot while assisting her  with dishwashing activities. A pyrex bowel shattered and she stepped on a piece of glass. Following the incident, she experienced significant discomfort, particularly during ambulation, accompanied by anxiety. She attempted to alleviate the discomfort by removing the glass using a magnifying device. She sought medical attention at an urgent care facility, where she was advised to consult a surgeon due to her type 1 diabetes diagnosis. It is noteworthy that her blood sugar levels were slightly elevated on the previous Thursday weekend.    Objective:      Physical Exam        Physical Exam  Vitals and nursing note reviewed.   Constitutional:       General: She is not in acute distress.     Appearance: She is well-developed.   HENT:      Head: Normocephalic and atraumatic.   Eyes:      Conjunctiva/sclera: Conjunctivae normal.      Pupils: Pupils are equal, round, and reactive to light.   Neck:      Thyroid: No thyromegaly.   Cardiovascular:      Rate and Rhythm: Normal rate and regular rhythm.      Heart sounds: Normal heart sounds.   Pulmonary:      Effort: Pulmonary effort is normal. No respiratory distress.      Breath sounds: Normal breath sounds. No wheezing.   Abdominal:      General: Bowel sounds are normal.      Palpations: Abdomen is soft.      Tenderness: There is no abdominal tenderness.   Musculoskeletal:         General: Normal range of motion.      Cervical back: Normal range of motion and neck supple.   Lymphadenopathy:      Cervical: No cervical adenopathy.   Skin:     General: Skin is warm and dry.      Findings: No rash.   Neurological:      Mental Status: She is alert and  oriented to person, place, and time.   Psychiatric:         Behavior: Behavior normal.         Results    Assessment:           1. Foreign body (FB) in soft tissue    2. Type 1 diabetes mellitus with hyperglycemia        Plan:     Assessment & Plan  1. Foot injury.  The patient exhibits no sensory deficits, and her finger sensation is satisfactory. The patient was advised to apply an over-the-counter product to gently remove a piece of glass from her foot. She was also advised to perform warm soaks and to use a drawing salve. The area will be closely examined. It was suggested that she could remove the piece of glass using a tweezer or our office. In the event of a larger red [place] on the foot, the patient is to contact us immediately, and we will initiate antibiotics and consider obtaining an x-ray.    Shirlene was seen today for foot injury.    Diagnoses and all orders for this visit:    Foreign body (FB) in soft tissue    Type 1 diabetes mellitus with hyperglycemia      Sugar today 140. Have been good.    RTC if condition acutely worsens or any other concerns, otherwise RTC as scheduled      This note was generated with the assistance of ambient listening technology. Verbal consent was obtained by the patient and accompanying visitor(s) for the recording of patient appointment to facilitate this note. I attest to having reviewed and edited the generated note for accuracy, though some syntax or spelling errors may persist. Please contact the author of this note for any clarification.

## 2024-06-11 ENCOUNTER — PATIENT OUTREACH (OUTPATIENT)
Dept: ADMINISTRATIVE | Facility: HOSPITAL | Age: 29
End: 2024-06-11
Payer: OTHER GOVERNMENT

## 2024-06-11 NOTE — PROGRESS NOTES
[facility] Cervical Cancer Screening Gap Report.  Chart reviewed, immunization record updated.  No new results noted on [facility] or [facility] web site.  Care Everywhere updated.   Patient care coordination note  LOV with PCP 5/16/2024.  Left detailed message for patient to return call to discuss Eye exam, Cervical Cancer Screening and schedule DM lab visit.

## 2024-06-16 ENCOUNTER — HOSPITAL ENCOUNTER (EMERGENCY)
Facility: HOSPITAL | Age: 29
Discharge: HOME | End: 2024-06-16
Attending: STUDENT IN AN ORGANIZED HEALTH CARE EDUCATION/TRAINING PROGRAM
Payer: OTHER GOVERNMENT

## 2024-06-16 VITALS
DIASTOLIC BLOOD PRESSURE: 61 MMHG | HEIGHT: 61 IN | HEART RATE: 66 BPM | SYSTOLIC BLOOD PRESSURE: 129 MMHG | TEMPERATURE: 98 F | RESPIRATION RATE: 16 BRPM | OXYGEN SATURATION: 98 % | BODY MASS INDEX: 32.7 KG/M2 | WEIGHT: 173.19 LBS

## 2024-06-16 DIAGNOSIS — S61.210A LACERATION OF RIGHT INDEX FINGER WITHOUT FOREIGN BODY WITHOUT DAMAGE TO NAIL, INITIAL ENCOUNTER: Primary | ICD-10-CM

## 2024-06-16 PROCEDURE — 12001 RPR S/N/AX/GEN/TRNK 2.5CM/<: CPT

## 2024-06-16 PROCEDURE — 99284 EMERGENCY DEPT VISIT MOD MDM: CPT | Mod: 25

## 2024-06-16 PROCEDURE — 25000003 PHARM REV CODE 250: Performed by: NURSE PRACTITIONER

## 2024-06-16 RX ORDER — LIDOCAINE HYDROCHLORIDE 10 MG/ML
10 INJECTION, SOLUTION EPIDURAL; INFILTRATION; INTRACAUDAL; PERINEURAL
Status: COMPLETED | OUTPATIENT
Start: 2024-06-16 | End: 2024-06-16

## 2024-06-16 RX ORDER — CEPHALEXIN 500 MG/1
500 CAPSULE ORAL 4 TIMES DAILY
Qty: 20 CAPSULE | Refills: 0 | Status: SHIPPED | OUTPATIENT
Start: 2024-06-16 | End: 2024-06-21

## 2024-06-16 RX ORDER — MUPIROCIN 20 MG/G
OINTMENT TOPICAL 3 TIMES DAILY
Qty: 15 G | Refills: 0 | Status: SHIPPED | OUTPATIENT
Start: 2024-06-16

## 2024-06-16 RX ADMIN — LIDOCAINE HYDROCHLORIDE 100 MG: 10 INJECTION, SOLUTION EPIDURAL; INFILTRATION; INTRACAUDAL at 06:06

## 2024-06-16 RX ADMIN — BACITRACIN ZINC, NEOMYCIN, POLYMYXIN B 1 EACH: 400; 3.5; 5 OINTMENT TOPICAL at 06:06

## 2024-06-16 NOTE — ED TRIAGE NOTES
C/o laceration to right index finger pta. Patient reports was cutting a block of cheese when she cut her finger.

## 2024-06-16 NOTE — ED PROVIDER NOTES
Encounter Date: 2024       History     Chief Complaint   Patient presents with    Laceration     Shirlene Shook is a 28 y.o. female with PMH of DMI, Hashimoto's, Celiac presenting for evaluation of finger laceration. Patient reports that she accidentally cut her R index finger with a knife while cutting a block of cheese. She presents with a V-shaped, flap laceration of the proximal end of the flexor side of her R index finger. Pain is mild, 2/10. No numbness or motor deficits. Last Tdap is unknown.     The history is provided by the patient.     Review of patient's allergies indicates:  No Known Allergies  Past Medical History:   Diagnosis Date    Anxiety     Celiac disease     Diabetes mellitus type I     Hashimoto's disease 2019     Past Surgical History:   Procedure Laterality Date    ADENOIDECTOMY       SECTION      x2    TONSILLECTOMY      WISDOM TOOTH EXTRACTION       Family History   Problem Relation Name Age of Onset    Cancer Mother      Cancer Maternal Aunt       Social History     Tobacco Use    Smoking status: Never    Smokeless tobacco: Never   Substance Use Topics    Alcohol use: Never    Drug use: Not Currently     Comment: teen age      Review of Systems   Constitutional:  Negative for activity change, chills and fever.   HENT:  Negative for congestion, ear discharge, ear pain, postnasal drip, sinus pressure, sinus pain and sore throat.    Respiratory:  Negative for cough, chest tightness and shortness of breath.    Cardiovascular:  Negative for chest pain.   Gastrointestinal:  Negative for abdominal distention, abdominal pain and nausea.   Genitourinary:  Negative for dysuria, frequency and urgency.   Musculoskeletal:  Negative for back pain.   Skin:  Positive for wound (Laceration R index finger). Negative for rash.   Neurological:  Negative for dizziness, weakness, light-headedness and numbness.   Hematological:  Does not bruise/bleed easily.       Physical Exam     Initial  Vitals [06/16/24 1836]   BP Pulse Resp Temp SpO2   120/84 74 18 97.6 °F (36.4 °C) 97 %      MAP       --         Physical Exam    Nursing note and vitals reviewed.  Constitutional: She appears well-developed and well-nourished.   HENT:   Head: Normocephalic and atraumatic.   Eyes: Conjunctivae and EOM are normal. Pupils are equal, round, and reactive to light.   Neck: Neck supple.   Cardiovascular:  Normal rate, regular rhythm, normal heart sounds and intact distal pulses.           Pulmonary/Chest: Breath sounds normal.   Abdominal: Abdomen is soft. Bowel sounds are normal.   Musculoskeletal:         General: Normal range of motion.      Cervical back: Neck supple.     Neurological: She is alert and oriented to person, place, and time. She has normal strength.   Skin: Skin is warm and dry.   Psychiatric: She has a normal mood and affect. Her behavior is normal. Judgment and thought content normal.         ED Course   Lac Repair    Date/Time: 6/16/2024 7:56 PM    Performed by: Chloe Blackburn NP  Authorized by: Urbano Vance MD    Consent:     Consent obtained:  Verbal    Consent given by:  Patient    Risks discussed:  Infection, need for additional repair, nerve damage, poor wound healing, poor cosmetic result, pain, retained foreign body, tendon damage and vascular damage    Alternatives discussed:  No treatment  Universal protocol:     Procedure explained and questions answered to patient or proxy's satisfaction: yes      Patient identity confirmed:  Verbally with patient, arm band and hospital-assigned identification number  Anesthesia:     Anesthesia method:  Local infiltration    Local anesthetic:  Lidocaine 1% w/o epi  Laceration details:     Location:  Finger    Finger location:  R index finger    Length (cm):  2 (V-shaped flap)  Pre-procedure details:     Preparation:  Patient was prepped and draped in usual sterile fashion  Exploration:     Hemostasis achieved with:  Direct pressure    Wound extent:  no areolar tissue violation noted, no fascia violation noted, no foreign bodies/material noted, no muscle damage noted, no nerve damage noted, no tendon damage noted, no underlying fracture noted and no vascular damage noted    Skin repair:     Repair method:  Sutures    Suture size:  5-0    Suture material:  Nylon    Suture technique:  Simple interrupted    Number of sutures:  8  Approximation:     Approximation:  Loose  Repair type:     Repair type:  Intermediate  Post-procedure details:     Dressing:  Antibiotic ointment    Procedure completion:  Tolerated well, no immediate complications  Comments:      V-shaped, flap laceration to the flexor side of right index finger    Labs Reviewed - No data to display       Imaging Results    None          Medications   Tdap (BOOSTRIX) vaccine injection 0.5 mL (0.5 mLs Intramuscular Not Given 6/16/24 1841)   LIDOcaine (PF) 10 mg/ml (1%) injection 100 mg (100 mg Infiltration Given 6/16/24 1855)   neomycin-bacitracnZn-polymyxnB packet 1 each (1 each Topical (Top) Given 6/16/24 1856)     Medical Decision Making  Evaluation of a 28-year-old female with a right index finger laceration.  Injury occurred PTA.  Accidentally lacerated finger with a knife while cutting cheese.  She presents with a v-shaped, flap laceration to the flexor side of her right index finger.  Remains neurovascularly intact with no motor deficits.  Laceration is superficial. Requesting dermabond but difficult to approximate therefore laceration was closed with sutures.     Differential diagnosis includes laceration    Risk  OTC drugs.  Prescription drug management.  Risk Details: Stable for discharge home.  Digital block performed and patient tolerated suture repair without difficulty.  Patient with history of DM type 1, will discharge home with prophylactic antibiotic.  Wound care instructions discussed with patient.  Monitor for signs and symptoms of infection.  Suture removal in the next 7-10 days.   Patient refused Tdap injection. Patient/caregiver voices understanding and feels comfortable with discharge plan.      The patient acknowledges that close follow up with medical provider is required. Instructed to follow up with PCP within 2 days. Patient was given specific return precautions. The patient agrees to comply with all instruction and directions given in the ER.                                        Clinical Impression:  Final diagnoses:  [S66.123A] Laceration of right index finger without foreign body without damage to nail, initial encounter (Primary)          ED Disposition Condition    Discharge Stable          ED Prescriptions       Medication Sig Dispense Start Date End Date Auth. Provider    mupirocin (BACTROBAN) 2 % ointment Apply topically 3 (three) times daily. 15 g 6/16/2024 -- Chloe Blackburn NP    cephALEXin (KEFLEX) 500 MG capsule Take 1 capsule (500 mg total) by mouth 4 (four) times daily. for 5 days 20 capsule 6/16/2024 6/21/2024 Chloe Blackburn NP          Follow-up Information       Follow up With Specialties Details Why Contact Info    Leanne Harrell MD Family Medicine Schedule an appointment as soon as possible for a visit in 2 days For wound re-check [address] [place] [id number]  [phone]               Chloe Blackburn NP  06/16/24 2001

## 2024-06-16 NOTE — ED TRIAGE NOTES
28 y.o. female presents to ER ED 01/ED 01A   Chief Complaint   Patient presents with    Laceration       C/o laceration to right index finger pta. Patient reports was cutting a block of cheese when she cut her finger.   Pt refused Tetanus shot per ANITA Paris

## 2024-06-25 ENCOUNTER — OFFICE VISIT (OUTPATIENT)
Dept: FAMILY MEDICINE | Facility: CLINIC | Age: 29
End: 2024-06-25
Payer: OTHER GOVERNMENT

## 2024-06-25 ENCOUNTER — LAB VISIT (OUTPATIENT)
Dept: FAMILY MEDICINE | Facility: CLINIC | Age: 29
End: 2024-06-25
Payer: OTHER GOVERNMENT

## 2024-06-25 VITALS
DIASTOLIC BLOOD PRESSURE: 68 MMHG | SYSTOLIC BLOOD PRESSURE: 102 MMHG | RESPIRATION RATE: 16 BRPM | BODY MASS INDEX: 34.6 KG/M2 | HEART RATE: 72 BPM | HEIGHT: 60 IN | WEIGHT: 176.25 LBS

## 2024-06-25 DIAGNOSIS — E06.3 HASHIMOTO'S THYROIDITIS: ICD-10-CM

## 2024-06-25 DIAGNOSIS — F41.1 GAD (GENERALIZED ANXIETY DISORDER): ICD-10-CM

## 2024-06-25 DIAGNOSIS — S61.211D LACERATION OF LEFT INDEX FINGER WITHOUT FOREIGN BODY WITHOUT DAMAGE TO NAIL, SUBSEQUENT ENCOUNTER: ICD-10-CM

## 2024-06-25 DIAGNOSIS — E10.65 TYPE 1 DIABETES MELLITUS WITH HYPERGLYCEMIA: ICD-10-CM

## 2024-06-25 DIAGNOSIS — E10.65 TYPE 1 DIABETES MELLITUS WITH HYPERGLYCEMIA: Primary | ICD-10-CM

## 2024-06-25 LAB
ALBUMIN SERPL BCP-MCNC: 3.7 G/DL (ref 3.5–5.2)
ALBUMIN/CREAT UR: 7.3 UG/MG (ref 0–30)
ALP SERPL-CCNC: 73 U/L (ref 55–135)
ALT SERPL W/O P-5'-P-CCNC: 13 U/L (ref 10–44)
ANION GAP SERPL CALC-SCNC: 7 MMOL/L (ref 8–16)
AST SERPL-CCNC: 18 U/L (ref 10–40)
BILIRUB SERPL-MCNC: 0.3 MG/DL (ref 0.1–1)
BUN SERPL-MCNC: 12 MG/DL (ref 6–20)
CALCIUM SERPL-MCNC: 8.9 MG/DL (ref 8.7–10.5)
CHLORIDE SERPL-SCNC: 103 MMOL/L (ref 95–110)
CHOLEST SERPL-MCNC: 171 MG/DL (ref 120–199)
CHOLEST/HDLC SERPL: 3.1 {RATIO} (ref 2–5)
CO2 SERPL-SCNC: 25 MMOL/L (ref 23–29)
CREAT SERPL-MCNC: 0.8 MG/DL (ref 0.5–1.4)
CREAT UR-MCNC: 136.4 MG/DL (ref 15–325)
EST. GFR  (NO RACE VARIABLE): >60 ML/MIN/1.73 M^2
ESTIMATED AVG GLUCOSE: 146 MG/DL (ref 68–131)
GLUCOSE SERPL-MCNC: 163 MG/DL (ref 70–110)
HBA1C MFR BLD: 6.7 % (ref 4–5.6)
HDLC SERPL-MCNC: 55 MG/DL (ref 40–75)
HDLC SERPL: 32.2 % (ref 20–50)
LDLC SERPL CALC-MCNC: 101.8 MG/DL (ref 63–159)
MICROALBUMIN UR DL<=1MG/L-MCNC: 10 UG/ML
NONHDLC SERPL-MCNC: 116 MG/DL
POTASSIUM SERPL-SCNC: 4.3 MMOL/L (ref 3.5–5.1)
PROT SERPL-MCNC: 7.1 G/DL (ref 6–8.4)
SODIUM SERPL-SCNC: 135 MMOL/L (ref 136–145)
TRIGL SERPL-MCNC: 71 MG/DL (ref 30–150)
TSH SERPL DL<=0.005 MIU/L-ACNC: 1.71 UIU/ML (ref 0.4–4)

## 2024-06-25 PROCEDURE — 99999 PR PBB SHADOW E&M-EST. PATIENT-LVL III: CPT | Mod: PBBFAC,,, | Performed by: FAMILY MEDICINE

## 2024-06-25 PROCEDURE — 80061 LIPID PANEL: CPT | Performed by: STUDENT IN AN ORGANIZED HEALTH CARE EDUCATION/TRAINING PROGRAM

## 2024-06-25 PROCEDURE — 84443 ASSAY THYROID STIM HORMONE: CPT | Performed by: STUDENT IN AN ORGANIZED HEALTH CARE EDUCATION/TRAINING PROGRAM

## 2024-06-25 PROCEDURE — 80053 COMPREHEN METABOLIC PANEL: CPT | Performed by: STUDENT IN AN ORGANIZED HEALTH CARE EDUCATION/TRAINING PROGRAM

## 2024-06-25 PROCEDURE — 99213 OFFICE O/P EST LOW 20 MIN: CPT | Mod: S$PBB,,, | Performed by: FAMILY MEDICINE

## 2024-06-25 PROCEDURE — 99999PBSHW PR PBB SHADOW TECHNICAL ONLY FILED TO HB: Mod: PBBFAC,,,

## 2024-06-25 PROCEDURE — 36415 COLL VENOUS BLD VENIPUNCTURE: CPT | Mod: PBBFAC

## 2024-06-25 PROCEDURE — 83036 HEMOGLOBIN GLYCOSYLATED A1C: CPT | Performed by: STUDENT IN AN ORGANIZED HEALTH CARE EDUCATION/TRAINING PROGRAM

## 2024-06-25 PROCEDURE — 99213 OFFICE O/P EST LOW 20 MIN: CPT | Mod: PBBFAC | Performed by: FAMILY MEDICINE

## 2024-06-25 PROCEDURE — 82570 ASSAY OF URINE CREATININE: CPT | Performed by: STUDENT IN AN ORGANIZED HEALTH CARE EDUCATION/TRAINING PROGRAM

## 2024-06-25 NOTE — PROGRESS NOTES
Subjective:       Patient ID: Shirlene Shook is a 28 y.o. female.    Chief Complaint: Follow-up (Patient had stitches in her right index finger, has taken them out herself but would just like to have it checked to make sure everything is ok)    HPI  28 year old female with t1d comes in after having a right 2nd digit laceration repaired in the er. She had 8 interrupted sutures placed and has slowly removed them herself. Incision looks great.     She has noticed a significant weight gain this past month. She has not been very compliant with her diet.    PMH, PSH, ALLERGIES, SH, FH reviewed in nurse's notes above  Medications reconciled in the nurse's notes      Review of Systems   Constitutional:  Positive for unexpected weight change. Negative for chills and fever.   HENT:  Negative for congestion, ear pain, postnasal drip, rhinorrhea, sore throat and trouble swallowing.    Eyes:  Negative for redness and itching.   Respiratory:  Negative for cough, shortness of breath and wheezing.    Cardiovascular:  Negative for chest pain and palpitations.   Gastrointestinal:  Negative for abdominal pain, diarrhea, nausea and vomiting.   Genitourinary:  Negative for dysuria and frequency.   Skin:  Positive for wound. Negative for rash.   Neurological:  Negative for weakness and headaches.       Objective:      Physical Exam  Vitals and nursing note reviewed.   Constitutional:       General: She is not in acute distress.     Appearance: She is well-developed.   HENT:      Head: Normocephalic and atraumatic.   Eyes:      Conjunctiva/sclera: Conjunctivae normal.      Pupils: Pupils are equal, round, and reactive to light.   Neck:      Thyroid: No thyromegaly.   Cardiovascular:      Rate and Rhythm: Normal rate and regular rhythm.      Heart sounds: Normal heart sounds.   Pulmonary:      Effort: Pulmonary effort is normal. No respiratory distress.      Breath sounds: Normal breath sounds. No wheezing.   Abdominal:      General:  Bowel sounds are normal.      Palpations: Abdomen is soft.      Tenderness: There is no abdominal tenderness.   Musculoskeletal:         General: Normal range of motion.      Cervical back: Normal range of motion and neck supple.   Lymphadenopathy:      Cervical: No cervical adenopathy.   Skin:     General: Skin is warm and dry.      Findings: No rash.      Comments: Well healed eliptical laceration to lateral side 2nd digit   Neurological:      Mental Status: She is alert and oriented to person, place, and time.   Psychiatric:         Behavior: Behavior normal.          Assessment/Plan:       Problem List Items Addressed This Visit          Psychiatric    TEE (generalized anxiety disorder)       Endocrine    Hashimoto's thyroiditis    Type 1 diabetes mellitus with hyperglycemia - Primary     Other Visit Diagnoses       Laceration of left index finger without foreign body without damage to nail, subsequent encounter            Incision well healing.    Labs drawn today.    RTC if condition acutely worsens or any other concerns, otherwise RTC as scheduled

## 2024-07-12 ENCOUNTER — OFFICE VISIT (OUTPATIENT)
Dept: ENDOCRINOLOGY | Facility: CLINIC | Age: 29
End: 2024-07-12
Payer: OTHER GOVERNMENT

## 2024-07-12 ENCOUNTER — PATIENT MESSAGE (OUTPATIENT)
Dept: ENDOCRINOLOGY | Facility: CLINIC | Age: 29
End: 2024-07-12

## 2024-07-12 VITALS
OXYGEN SATURATION: 97 % | HEIGHT: 61 IN | HEART RATE: 67 BPM | WEIGHT: 175.5 LBS | SYSTOLIC BLOOD PRESSURE: 116 MMHG | BODY MASS INDEX: 33.14 KG/M2 | DIASTOLIC BLOOD PRESSURE: 68 MMHG

## 2024-07-12 DIAGNOSIS — E06.3 HASHIMOTO'S THYROIDITIS: ICD-10-CM

## 2024-07-12 DIAGNOSIS — E11.65 TYPE 2 DIABETES MELLITUS WITH HYPERGLYCEMIA, WITH LONG-TERM CURRENT USE OF INSULIN: Primary | ICD-10-CM

## 2024-07-12 DIAGNOSIS — E10.65 UNCONTROLLED TYPE 1 DIABETES MELLITUS WITH HYPERGLYCEMIA: ICD-10-CM

## 2024-07-12 DIAGNOSIS — Z96.41 INSULIN PUMP IN PLACE: ICD-10-CM

## 2024-07-12 DIAGNOSIS — Z79.4 TYPE 2 DIABETES MELLITUS WITH HYPERGLYCEMIA, WITH LONG-TERM CURRENT USE OF INSULIN: Primary | ICD-10-CM

## 2024-07-12 PROCEDURE — 99213 OFFICE O/P EST LOW 20 MIN: CPT | Mod: PBBFAC | Performed by: STUDENT IN AN ORGANIZED HEALTH CARE EDUCATION/TRAINING PROGRAM

## 2024-07-12 PROCEDURE — 99999 PR PBB SHADOW E&M-EST. PATIENT-LVL III: CPT | Mod: PBBFAC,,, | Performed by: STUDENT IN AN ORGANIZED HEALTH CARE EDUCATION/TRAINING PROGRAM

## 2024-07-12 RX ORDER — DULAGLUTIDE 0.75 MG/.5ML
0.75 INJECTION, SOLUTION SUBCUTANEOUS
Qty: 4 PEN | Refills: 0 | Status: SHIPPED | OUTPATIENT
Start: 2024-07-12 | End: 2024-08-11

## 2024-07-12 RX ORDER — INSULIN ASPART 100 [IU]/ML
INJECTION, SOLUTION INTRAVENOUS; SUBCUTANEOUS
Qty: 40 ML | Refills: 11 | Status: SHIPPED | OUTPATIENT
Start: 2024-07-12

## 2024-07-12 RX ORDER — SEMAGLUTIDE 0.68 MG/ML
INJECTION, SOLUTION SUBCUTANEOUS
Qty: 3 ML | Refills: 11 | Status: SHIPPED | OUTPATIENT
Start: 2024-07-12 | End: 2024-07-12

## 2024-07-12 NOTE — PROGRESS NOTES
Subjective:      Patient ID: Shirlene Shook is a 28 y.o. female.    Chief Complaint:  Type 2 diabetes mellitus    History of Present Illness  This is a 28 y.o. female. with a past medical history of Type 2 diabetes mellitus, Hashimoto's thyroiditis here for evaluation.    Type 2 diabetes mellitus - treating as type 2 mellitus with insulin deficiency given insulin resistance  Diagnosed around age 17    Current diabetes medications:  - Insulin Novolog via Tandem tslim X2 insulin pump    Control IQ: ON    Basal rate  12A: 1.1 U/h  4P:  1.2 U /h    ICR  12A: 9    ISF  12A: 30      Target: 110  IAT: 5h      TDD 51 U      Past diabetes medications:  - Classic Omnipod  - Lantus  - Metformin      Lab Results   Component Value Date    CREATININE 0.8 06/25/2024    EGFRNORACEVR >60 06/25/2024       Known diabetic complications: none    Weight trend:    Wt Readings from Last 10 Encounters:   07/12/24 79.6 kg (175 lb 8 oz)   06/25/24 79.9 kg (176 lb 4.1 oz)   06/16/24 78.5 kg (173 lb 2.7 oz)   05/16/24 76.6 kg (168 lb 15.7 oz)   02/27/24 70.7 kg (155 lb 13.8 oz)   01/24/24 75.4 kg (166 lb 1.9 oz)   11/20/23 73.9 kg (163 lb)   07/05/23 72.1 kg (159 lb)   04/12/23 68 kg (149 lb 14.6 oz)   02/28/23 70.5 kg (155 lb 8.6 oz)     Great grandmother type 1 diabetes    Prior visit with diabetes education: yes    Current diet: Around 30 g carbs per day  Physical activity: Running, biking with children    Blood glucose monitoring at home: See download on media    She has 2 children   had vasectomy      Diabetes Management Status  Statin: Not taking  ACE/ARB: Not taking    Screening or Prevention Patient's value   HgA1C Testing and Control   Lab Results   Component Value Date    HGBA1C 6.7 (H) 06/25/2024        LDL control Lab Results   Component Value Date    LDLCALC 101.8 06/25/2024      Nephropathy screening Lab Results   Component Value Date    MICALBCREAT 7.3 06/25/2024        Last eye exam: :  "08/17/2022          Hypothyroidism  Currently on levothyroxine 100 mcg daily  Reports takes 30 min before eating or drinking anything other than water.   Reports taking separate from multivitamins by at least 4 hours    Lab Results   Component Value Date    TSH 1.711 06/25/2024         Review of Systems  As above    Social and family history reviewed  Current medications and allergies reviewed    Objective:   /68   Pulse 67   Ht 5' 1" (1.549 m)   Wt 79.6 kg (175 lb 8 oz)   LMP 06/18/2024 (Approximate)   SpO2 97%   BMI 33.16 kg/m²   Physical Exam  Alert, oriented    BP Readings from Last 1 Encounters:   07/12/24 116/68      Wt Readings from Last 1 Encounters:   07/12/24 1449 79.6 kg (175 lb 8 oz)     Body mass index is 33.16 kg/m².    Lab Review:   Lab Results   Component Value Date    HGBA1C 6.7 (H) 06/25/2024     Lab Results   Component Value Date    CHOL 171 06/25/2024    HDL 55 06/25/2024    LDLCALC 101.8 06/25/2024    TRIG 71 06/25/2024    CHOLHDL 32.2 06/25/2024     Lab Results   Component Value Date     (L) 06/25/2024    K 4.3 06/25/2024     06/25/2024    CO2 25 06/25/2024     (H) 06/25/2024    BUN 12 06/25/2024    CREATININE 0.8 06/25/2024    CALCIUM 8.9 06/25/2024    PROT 7.1 06/25/2024    ALBUMIN 3.7 06/25/2024    BILITOT 0.3 06/25/2024    ALKPHOS 73 06/25/2024    AST 18 06/25/2024    ALT 13 06/25/2024    ANIONGAP 7 (L) 06/25/2024    TSH 1.711 06/25/2024       All pertinent labs reviewed    Assessment and Plan     Type 2 diabetes mellitus with hyperglycemia, with long-term current use of insulin  Treating as type 2 diabetes with insulin deficiency given insulin resistance  Benefits from GLP-1 RA given requiring > 0.6 U/kg/d of insulin and BMI 31    Plan  - Start Trulicity 0.75 mg SQ weekly, increase to 1.5 mg SQ weekly after 4 weeks if tolerated (preferred by insurance)  - Continue novolog via Tandem tslim X2 insulin pump  Control IQ: ON    Basal rate  12A: 1.1 U/h  4P:  1.2 U " /h    ICR  12A: 9    ISF  12A: 30      Target: 110  IAT: 5h    Insulin pump in place  Continue Tandem tslim X2 insulin pump    Hashimoto's thyroiditis  Last TSH at goal for age  Continue levothyroxine 100 mcg daily        Follow-up in 4 months    Javed Luu MD  Endocrinology

## 2024-07-12 NOTE — ASSESSMENT & PLAN NOTE
Treating as type 2 diabetes with insulin deficiency given insulin resistance  Benefits from GLP-1 RA given requiring > 0.6 U/kg/d of insulin and BMI 31    Plan  - Start Trulicity 0.75 mg SQ weekly, increase to 1.5 mg SQ weekly after 4 weeks if tolerated (preferred by insurance)  - Continue novolog via Tandem tslim X2 insulin pump  Control IQ: ON    Basal rate  12A: 1.1 U/h  4P:  1.2 U /h    ICR  12A: 9    ISF  12A: 30      Target: 110  IAT: 5h

## 2024-07-25 ENCOUNTER — PATIENT MESSAGE (OUTPATIENT)
Dept: ENDOCRINOLOGY | Facility: CLINIC | Age: 29
End: 2024-07-25
Payer: OTHER GOVERNMENT

## 2024-08-02 DIAGNOSIS — E11.65 TYPE 2 DIABETES MELLITUS WITH HYPERGLYCEMIA, WITH LONG-TERM CURRENT USE OF INSULIN: Primary | ICD-10-CM

## 2024-08-02 DIAGNOSIS — Z79.4 TYPE 2 DIABETES MELLITUS WITH HYPERGLYCEMIA, WITH LONG-TERM CURRENT USE OF INSULIN: Primary | ICD-10-CM

## 2024-08-02 RX ORDER — DULAGLUTIDE 1.5 MG/.5ML
1.5 INJECTION, SOLUTION SUBCUTANEOUS
Qty: 4 PEN | Refills: 11 | Status: SHIPPED | OUTPATIENT
Start: 2024-08-02 | End: 2025-08-02

## 2024-08-14 DIAGNOSIS — E10.65 UNCONTROLLED TYPE 1 DIABETES MELLITUS WITH HYPERGLYCEMIA: ICD-10-CM

## 2024-08-14 RX ORDER — INSULIN ASPART 100 [IU]/ML
INJECTION, SOLUTION INTRAVENOUS; SUBCUTANEOUS
Qty: 120 ML | Refills: 3 | Status: SHIPPED | OUTPATIENT
Start: 2024-08-14

## 2024-09-09 DIAGNOSIS — E11.65 TYPE 2 DIABETES MELLITUS WITH HYPERGLYCEMIA, WITH LONG-TERM CURRENT USE OF INSULIN: Primary | ICD-10-CM

## 2024-09-09 DIAGNOSIS — Z79.4 TYPE 2 DIABETES MELLITUS WITH HYPERGLYCEMIA, WITH LONG-TERM CURRENT USE OF INSULIN: ICD-10-CM

## 2024-09-09 DIAGNOSIS — Z79.4 TYPE 2 DIABETES MELLITUS WITH HYPERGLYCEMIA, WITH LONG-TERM CURRENT USE OF INSULIN: Primary | ICD-10-CM

## 2024-09-09 DIAGNOSIS — E11.65 TYPE 2 DIABETES MELLITUS WITH HYPERGLYCEMIA, WITH LONG-TERM CURRENT USE OF INSULIN: ICD-10-CM

## 2024-09-09 RX ORDER — DULAGLUTIDE 1.5 MG/.5ML
1.5 INJECTION, SOLUTION SUBCUTANEOUS
Qty: 4 PEN | Refills: 11 | OUTPATIENT
Start: 2024-09-09 | End: 2025-09-09

## 2024-09-09 RX ORDER — DULAGLUTIDE 3 MG/.5ML
3 INJECTION, SOLUTION SUBCUTANEOUS
Qty: 12 PEN | Refills: 3 | Status: SHIPPED | OUTPATIENT
Start: 2024-09-09 | End: 2025-09-09

## 2024-10-30 ENCOUNTER — OFFICE VISIT (OUTPATIENT)
Dept: ENDOCRINOLOGY | Facility: CLINIC | Age: 29
End: 2024-10-30
Payer: OTHER GOVERNMENT

## 2024-10-30 VITALS
BODY MASS INDEX: 32.92 KG/M2 | HEIGHT: 61 IN | SYSTOLIC BLOOD PRESSURE: 120 MMHG | HEART RATE: 77 BPM | RESPIRATION RATE: 17 BRPM | OXYGEN SATURATION: 99 % | DIASTOLIC BLOOD PRESSURE: 70 MMHG | WEIGHT: 174.38 LBS

## 2024-10-30 DIAGNOSIS — E06.3 HASHIMOTO'S THYROIDITIS: ICD-10-CM

## 2024-10-30 DIAGNOSIS — Z79.4 TYPE 2 DIABETES MELLITUS WITH HYPERGLYCEMIA, WITH LONG-TERM CURRENT USE OF INSULIN: ICD-10-CM

## 2024-10-30 DIAGNOSIS — Z96.41 INSULIN PUMP IN PLACE: ICD-10-CM

## 2024-10-30 DIAGNOSIS — E10.65 TYPE 1 DIABETES MELLITUS WITH HYPERGLYCEMIA: Primary | ICD-10-CM

## 2024-10-30 DIAGNOSIS — E11.65 TYPE 2 DIABETES MELLITUS WITH HYPERGLYCEMIA, WITH LONG-TERM CURRENT USE OF INSULIN: ICD-10-CM

## 2024-10-30 PROCEDURE — 99999 PR PBB SHADOW E&M-EST. PATIENT-LVL III: CPT | Mod: PBBFAC,,, | Performed by: STUDENT IN AN ORGANIZED HEALTH CARE EDUCATION/TRAINING PROGRAM

## 2024-10-30 PROCEDURE — 99213 OFFICE O/P EST LOW 20 MIN: CPT | Mod: PBBFAC | Performed by: STUDENT IN AN ORGANIZED HEALTH CARE EDUCATION/TRAINING PROGRAM

## 2024-10-30 RX ORDER — INSULIN GLARGINE 100 [IU]/ML
40 INJECTION, SOLUTION SUBCUTANEOUS NIGHTLY
Qty: 15 ML | Refills: 0 | Status: SHIPPED | OUTPATIENT
Start: 2024-10-30

## 2024-10-30 RX ORDER — GLUCAGON 3 MG/1
POWDER NASAL
Qty: 2 EACH | Refills: 0 | Status: SHIPPED | OUTPATIENT
Start: 2024-10-30

## 2024-11-07 ENCOUNTER — PATIENT OUTREACH (OUTPATIENT)
Dept: ADMINISTRATIVE | Facility: HOSPITAL | Age: 29
End: 2024-11-07
Payer: OTHER GOVERNMENT

## 2024-11-07 NOTE — PROGRESS NOTES
Contacted pt in reference to overdue DM eye exam and cervical screening. Pt states she aware of her overdue screening but she been busy and will schedule soon

## 2024-11-12 ENCOUNTER — PATIENT MESSAGE (OUTPATIENT)
Dept: ENDOCRINOLOGY | Facility: CLINIC | Age: 29
End: 2024-11-12
Payer: OTHER GOVERNMENT

## 2024-11-12 ENCOUNTER — LAB VISIT (OUTPATIENT)
Dept: LAB | Facility: HOSPITAL | Age: 29
End: 2024-11-12
Attending: STUDENT IN AN ORGANIZED HEALTH CARE EDUCATION/TRAINING PROGRAM
Payer: OTHER GOVERNMENT

## 2024-11-12 DIAGNOSIS — E06.3 HASHIMOTO'S THYROIDITIS: Primary | ICD-10-CM

## 2024-11-12 DIAGNOSIS — E06.3 HASHIMOTO'S THYROIDITIS: ICD-10-CM

## 2024-11-12 DIAGNOSIS — E10.65 TYPE 1 DIABETES MELLITUS WITH HYPERGLYCEMIA: ICD-10-CM

## 2024-11-12 DIAGNOSIS — Z79.4 TYPE 2 DIABETES MELLITUS WITH HYPERGLYCEMIA, WITH LONG-TERM CURRENT USE OF INSULIN: ICD-10-CM

## 2024-11-12 DIAGNOSIS — E11.65 TYPE 2 DIABETES MELLITUS WITH HYPERGLYCEMIA, WITH LONG-TERM CURRENT USE OF INSULIN: ICD-10-CM

## 2024-11-12 LAB
ALBUMIN SERPL BCP-MCNC: 4 G/DL (ref 3.5–5.2)
ALP SERPL-CCNC: 56 U/L (ref 40–150)
ALT SERPL W/O P-5'-P-CCNC: 13 U/L (ref 10–44)
ANION GAP SERPL CALC-SCNC: 8 MMOL/L (ref 8–16)
AST SERPL-CCNC: 17 U/L (ref 10–40)
BILIRUB SERPL-MCNC: 0.3 MG/DL (ref 0.1–1)
BUN SERPL-MCNC: 14 MG/DL (ref 6–20)
CALCIUM SERPL-MCNC: 9.4 MG/DL (ref 8.7–10.5)
CHLORIDE SERPL-SCNC: 104 MMOL/L (ref 95–110)
CO2 SERPL-SCNC: 26 MMOL/L (ref 23–29)
CREAT SERPL-MCNC: 0.8 MG/DL (ref 0.5–1.4)
EST. GFR  (NO RACE VARIABLE): >60 ML/MIN/1.73 M^2
ESTIMATED AVG GLUCOSE: 126 MG/DL (ref 68–131)
GLUCOSE SERPL-MCNC: 126 MG/DL (ref 70–110)
HBA1C MFR BLD: 6 % (ref 4–5.6)
POTASSIUM SERPL-SCNC: 4.3 MMOL/L (ref 3.5–5.1)
PROT SERPL-MCNC: 7.5 G/DL (ref 6–8.4)
SODIUM SERPL-SCNC: 138 MMOL/L (ref 136–145)
TSH SERPL DL<=0.005 MIU/L-ACNC: 3.13 UIU/ML (ref 0.4–4)

## 2024-11-12 PROCEDURE — 80053 COMPREHEN METABOLIC PANEL: CPT | Performed by: STUDENT IN AN ORGANIZED HEALTH CARE EDUCATION/TRAINING PROGRAM

## 2024-11-12 PROCEDURE — 36415 COLL VENOUS BLD VENIPUNCTURE: CPT | Performed by: STUDENT IN AN ORGANIZED HEALTH CARE EDUCATION/TRAINING PROGRAM

## 2024-11-12 PROCEDURE — 84443 ASSAY THYROID STIM HORMONE: CPT | Performed by: STUDENT IN AN ORGANIZED HEALTH CARE EDUCATION/TRAINING PROGRAM

## 2024-11-12 PROCEDURE — 83036 HEMOGLOBIN GLYCOSYLATED A1C: CPT | Performed by: STUDENT IN AN ORGANIZED HEALTH CARE EDUCATION/TRAINING PROGRAM

## 2024-11-15 ENCOUNTER — TELEPHONE (OUTPATIENT)
Dept: ENDOCRINOLOGY | Facility: CLINIC | Age: 29
End: 2024-11-15
Payer: OTHER GOVERNMENT

## 2024-11-15 DIAGNOSIS — E06.3 HASHIMOTO'S THYROIDITIS: Primary | ICD-10-CM

## 2024-11-15 RX ORDER — LEVOTHYROXINE SODIUM 112 UG/1
112 TABLET ORAL
Qty: 90 TABLET | Refills: 3 | Status: SHIPPED | OUTPATIENT
Start: 2024-11-15 | End: 2025-11-15

## 2024-11-15 RX ORDER — LEVOTHYROXINE SODIUM 112 UG/1
112 TABLET ORAL
Qty: 30 TABLET | Refills: 0 | Status: SHIPPED | OUTPATIENT
Start: 2024-11-15 | End: 2025-11-15

## 2024-11-15 NOTE — TELEPHONE ENCOUNTER
----- Message from Anahy Weems PA-C sent at 11/15/2024  8:14 AM CST -----  Regarding: Labs  labs in 6 weeks please

## 2024-11-18 ENCOUNTER — PATIENT MESSAGE (OUTPATIENT)
Dept: ADMINISTRATIVE | Facility: HOSPITAL | Age: 29
End: 2024-11-18
Payer: OTHER GOVERNMENT

## 2024-12-06 ENCOUNTER — PATIENT MESSAGE (OUTPATIENT)
Dept: ADMINISTRATIVE | Facility: HOSPITAL | Age: 29
End: 2024-12-06
Payer: OTHER GOVERNMENT

## 2024-12-17 ENCOUNTER — PATIENT MESSAGE (OUTPATIENT)
Dept: ENDOCRINOLOGY | Facility: CLINIC | Age: 29
End: 2024-12-17
Payer: OTHER GOVERNMENT

## 2024-12-17 DIAGNOSIS — Z79.4 TYPE 2 DIABETES MELLITUS WITH HYPERGLYCEMIA, WITH LONG-TERM CURRENT USE OF INSULIN: ICD-10-CM

## 2024-12-17 DIAGNOSIS — E11.65 TYPE 2 DIABETES MELLITUS WITH HYPERGLYCEMIA, WITH LONG-TERM CURRENT USE OF INSULIN: ICD-10-CM

## 2024-12-17 RX ORDER — DULAGLUTIDE 3 MG/.5ML
3 INJECTION, SOLUTION SUBCUTANEOUS
Qty: 12 PEN | Refills: 3 | Status: SHIPPED | OUTPATIENT
Start: 2024-12-17 | End: 2025-12-17

## 2024-12-26 ENCOUNTER — PATIENT OUTREACH (OUTPATIENT)
Dept: ADMINISTRATIVE | Facility: HOSPITAL | Age: 29
End: 2024-12-26
Payer: OTHER GOVERNMENT

## 2024-12-27 DIAGNOSIS — Z79.4 TYPE 2 DIABETES MELLITUS WITH HYPERGLYCEMIA, WITH LONG-TERM CURRENT USE OF INSULIN: Primary | ICD-10-CM

## 2024-12-27 DIAGNOSIS — E11.65 TYPE 2 DIABETES MELLITUS WITH HYPERGLYCEMIA, WITH LONG-TERM CURRENT USE OF INSULIN: Primary | ICD-10-CM

## 2024-12-27 RX ORDER — DULAGLUTIDE 4.5 MG/.5ML
4.5 INJECTION, SOLUTION SUBCUTANEOUS
Qty: 4 PEN | Refills: 11 | Status: SHIPPED | OUTPATIENT
Start: 2024-12-27 | End: 2025-12-27

## 2024-12-30 ENCOUNTER — PATIENT MESSAGE (OUTPATIENT)
Dept: FAMILY MEDICINE | Facility: CLINIC | Age: 29
End: 2024-12-30
Payer: OTHER GOVERNMENT

## 2025-01-02 RX ORDER — FLUCONAZOLE 150 MG/1
150 TABLET ORAL ONCE
Qty: 1 TABLET | Refills: 0 | Status: SHIPPED | OUTPATIENT
Start: 2025-01-02 | End: 2025-01-02

## 2025-01-08 DIAGNOSIS — E11.9 TYPE 2 DIABETES MELLITUS WITHOUT COMPLICATION, UNSPECIFIED WHETHER LONG TERM INSULIN USE: ICD-10-CM

## 2025-02-04 ENCOUNTER — PATIENT MESSAGE (OUTPATIENT)
Dept: ENDOCRINOLOGY | Facility: CLINIC | Age: 30
End: 2025-02-04

## 2025-02-04 DIAGNOSIS — Z79.4 TYPE 2 DIABETES MELLITUS WITH HYPERGLYCEMIA, WITH LONG-TERM CURRENT USE OF INSULIN: ICD-10-CM

## 2025-02-04 DIAGNOSIS — E11.65 TYPE 2 DIABETES MELLITUS WITH HYPERGLYCEMIA, WITH LONG-TERM CURRENT USE OF INSULIN: ICD-10-CM

## 2025-02-04 RX ORDER — DULAGLUTIDE 4.5 MG/.5ML
4.5 INJECTION, SOLUTION SUBCUTANEOUS
Qty: 4 PEN | Refills: 11 | Status: SHIPPED | OUTPATIENT
Start: 2025-02-04 | End: 2025-02-27 | Stop reason: SDUPTHER

## 2025-02-05 ENCOUNTER — OFFICE VISIT (OUTPATIENT)
Dept: FAMILY MEDICINE | Facility: CLINIC | Age: 30
End: 2025-02-05
Payer: OTHER GOVERNMENT

## 2025-02-05 VITALS
DIASTOLIC BLOOD PRESSURE: 68 MMHG | SYSTOLIC BLOOD PRESSURE: 96 MMHG | BODY MASS INDEX: 30.3 KG/M2 | HEART RATE: 88 BPM | HEIGHT: 61 IN | WEIGHT: 160.5 LBS | RESPIRATION RATE: 20 BRPM

## 2025-02-05 DIAGNOSIS — Z96.41 INSULIN PUMP IN PLACE: ICD-10-CM

## 2025-02-05 DIAGNOSIS — E06.3 HASHIMOTO'S THYROIDITIS: Primary | ICD-10-CM

## 2025-02-05 DIAGNOSIS — E10.65 TYPE 1 DIABETES MELLITUS WITH HYPERGLYCEMIA: ICD-10-CM

## 2025-02-05 DIAGNOSIS — F41.1 GAD (GENERALIZED ANXIETY DISORDER): ICD-10-CM

## 2025-02-05 DIAGNOSIS — K90.0 CELIAC DISEASE: ICD-10-CM

## 2025-02-05 PROCEDURE — 99213 OFFICE O/P EST LOW 20 MIN: CPT | Mod: S$PBB,,, | Performed by: FAMILY MEDICINE

## 2025-02-05 PROCEDURE — 99999 PR PBB SHADOW E&M-EST. PATIENT-LVL III: CPT | Mod: PBBFAC,,, | Performed by: FAMILY MEDICINE

## 2025-02-05 PROCEDURE — 99213 OFFICE O/P EST LOW 20 MIN: CPT | Mod: PBBFAC | Performed by: FAMILY MEDICINE

## 2025-02-05 RX ORDER — MONTELUKAST SODIUM 10 MG/1
1 TABLET ORAL DAILY
COMMUNITY

## 2025-02-06 PROBLEM — E11.65 TYPE 2 DIABETES MELLITUS WITH HYPERGLYCEMIA, WITH LONG-TERM CURRENT USE OF INSULIN: Status: RESOLVED | Noted: 2024-07-12 | Resolved: 2025-02-06

## 2025-02-06 PROBLEM — Z79.4 TYPE 2 DIABETES MELLITUS WITH HYPERGLYCEMIA, WITH LONG-TERM CURRENT USE OF INSULIN: Status: RESOLVED | Noted: 2024-07-12 | Resolved: 2025-02-06

## 2025-02-06 NOTE — PROGRESS NOTES
Subjective:       Patient ID: Shirlene Shook is a 29 y.o. female.    Chief Complaint: Annual Exam    History of Present Illness    Patient presents today for follow up of diabetes and Hashimoto's disease. She uses an insulin pump with a 1:10 ratio for bolus insulin and a basal rate of 1 unit per hour. Daily insulin usage has been consistent at 17-18 units, with one day reaching 28 units due to consuming a gluten-free mckenzie cake. Since starting Trulicity, her glucose control has improved significantly with levels mostly around 100 mg/dL and no hypoglycemic episodes. She reports reduced anxiety about her blood sugar. As part of management, she maintains a high-protein diet, aiming for approximately 100 g daily. She exercises regularly at the gym at minimum every other day, sometimes in the morning while children are in . She has severe allergic reactions to cats, presenting with facial angioedema including complete eye swelling, lip swelling, tongue swelling, and cutaneous manifestations with splotches.         Objective:          Physical Exam  Vitals and nursing note reviewed.   Constitutional:       General: She is not in acute distress.     Appearance: She is well-developed.   HENT:      Head: Normocephalic and atraumatic.   Eyes:      Conjunctiva/sclera: Conjunctivae normal.      Pupils: Pupils are equal, round, and reactive to light.   Neck:      Thyroid: No thyromegaly.   Cardiovascular:      Rate and Rhythm: Normal rate and regular rhythm.      Heart sounds: Normal heart sounds.   Pulmonary:      Effort: Pulmonary effort is normal. No respiratory distress.      Breath sounds: Normal breath sounds. No wheezing.   Abdominal:      General: Bowel sounds are normal.      Palpations: Abdomen is soft.      Tenderness: There is no abdominal tenderness.   Musculoskeletal:         General: Normal range of motion.      Cervical back: Normal range of motion and neck supple.   Lymphadenopathy:      Cervical: No  cervical adenopathy.   Skin:     General: Skin is warm and dry.      Findings: No rash.   Neurological:      Mental Status: She is alert and oriented to person, place, and time.   Psychiatric:         Behavior: Behavior normal.         Assessment:           1. Hashimoto's thyroiditis    2. Type 1 diabetes mellitus with hyperglycemia    3. Insulin pump in place    4. Celiac disease    5. TEE (generalized anxiety disorder)        Plan:     Assessment & Plan    DIABETES:  - Monitored patient's blood sugar, which are mostly around 100, with no reported low blood sugar episodes since starting Trulicity.  - Noted patient's daily insulin usage of 17-18 units, with one day of 28 units due to consuming a gluten-free mckenzie cake.  - Evaluated patient's blood sugar control, noting steady levels without peaks and reduced anxiety about blood sugar.  - Acknowledged improvement in blood sugar control with Trulicity.  - Continued Trulicity 4.5mg weekly.  - Maintained insulin pump therapy with Aspart: basal rate 1 unit/hour, carb ratio 1:10, and correction factor 1:30.  - Advised patient to continue high protein diet, consuming 100 g of protein daily.  - Continued insulin pump therapy with Aspart.  - Maintained current insulin pump settings: basal rate 1 unit/hour, carb ratio 1:10, and correction factor 1:30.  - Continued Trulicity 4.5 mg weekly.  - Monitored patient's daily insulin usage, ranging from 17-28 units.  - Maintained insulin pump therapy with Aspart: basal rate 1 unit/hour, carb ratio 1:10, and correction factor 1:30.  - Provided patient with a 3-month supply of insulin for $38.  - Patient to continue high protein diet (100 g of protein per day).    ALLERGIES:  - Noted patient's severe allergic reactions to cats, including angioedema of eyes, lips, and tongue, as well as urticaria.  - Documented patient's allergic reactions to dogs, including contact lens issues and ocular irritation.  - Evaluated patient's immediate  allergic reactions when exposed to cat dander, including dyspnea and periorbital edema.  - Advised patient to remove cats from the home and use air purifiers to reduce allergens.  - Monitored patient's urticaria as part of allergic reaction to cats.  - Acknowledged the severity of the patient's allergic reactions.  - Recommend removal of allergen source (cats) from the home and use of air purifiers to reduce inflammation.    FOLLOW UP:  - Patient to maintain current exercise routine of going to the gym every other day.  - Patient to complete paperwork for  within the next few months.  - Patient to message the office when ready to  paperwork.         Shirlene was seen today for annual exam.    Diagnoses and all orders for this visit:    Hashimoto's thyroiditis    Type 1 diabetes mellitus with hyperglycemia    Insulin pump in place    Celiac disease    TEE (generalized anxiety disorder)          RTC if condition acutely worsens or any other concerns, otherwise RTC as scheduled      This note was generated with the assistance of ambient listening technology. Verbal consent was obtained by the patient and accompanying visitor(s) for the recording of patient appointment to facilitate this note. I attest to having reviewed and edited the generated note for accuracy, though some syntax or spelling errors may persist. Please contact the author of this note for any clarification.   .deep

## 2025-02-20 ENCOUNTER — TELEPHONE (OUTPATIENT)
Dept: ENDOCRINOLOGY | Facility: CLINIC | Age: 30
End: 2025-02-20
Payer: OTHER GOVERNMENT

## 2025-02-27 DIAGNOSIS — E11.65 TYPE 2 DIABETES MELLITUS WITH HYPERGLYCEMIA, WITH LONG-TERM CURRENT USE OF INSULIN: ICD-10-CM

## 2025-02-27 DIAGNOSIS — Z79.4 TYPE 2 DIABETES MELLITUS WITH HYPERGLYCEMIA, WITH LONG-TERM CURRENT USE OF INSULIN: ICD-10-CM

## 2025-02-27 RX ORDER — DULAGLUTIDE 4.5 MG/.5ML
4.5 INJECTION, SOLUTION SUBCUTANEOUS
Qty: 4 PEN | Refills: 11 | Status: SHIPPED | OUTPATIENT
Start: 2025-02-27 | End: 2026-02-27

## 2025-03-03 ENCOUNTER — PATIENT MESSAGE (OUTPATIENT)
Dept: FAMILY MEDICINE | Facility: CLINIC | Age: 30
End: 2025-03-03
Payer: OTHER GOVERNMENT

## 2025-03-03 DIAGNOSIS — Z01.00 EYE EXAM, ROUTINE: Primary | ICD-10-CM

## 2025-03-05 ENCOUNTER — TELEPHONE (OUTPATIENT)
Dept: FAMILY MEDICINE | Facility: CLINIC | Age: 30
End: 2025-03-05
Payer: OTHER GOVERNMENT

## 2025-03-05 DIAGNOSIS — E10.9 TYPE 1 DIABETES MELLITUS WITHOUT COMPLICATION: Primary | ICD-10-CM

## 2025-03-13 ENCOUNTER — PATIENT MESSAGE (OUTPATIENT)
Dept: FAMILY MEDICINE | Facility: CLINIC | Age: 30
End: 2025-03-13
Payer: OTHER GOVERNMENT

## 2025-03-26 ENCOUNTER — OFFICE VISIT (OUTPATIENT)
Dept: ENDOCRINOLOGY | Facility: CLINIC | Age: 30
End: 2025-03-26
Payer: OTHER GOVERNMENT

## 2025-03-26 VITALS
SYSTOLIC BLOOD PRESSURE: 110 MMHG | DIASTOLIC BLOOD PRESSURE: 64 MMHG | WEIGHT: 158.19 LBS | BODY MASS INDEX: 29.86 KG/M2 | HEIGHT: 61 IN

## 2025-03-26 DIAGNOSIS — Z96.41 INSULIN PUMP IN PLACE: ICD-10-CM

## 2025-03-26 DIAGNOSIS — E11.65 TYPE 2 DIABETES MELLITUS WITH HYPERGLYCEMIA, WITH LONG-TERM CURRENT USE OF INSULIN: Primary | ICD-10-CM

## 2025-03-26 DIAGNOSIS — E10.65 TYPE 1 DIABETES MELLITUS WITH HYPERGLYCEMIA: ICD-10-CM

## 2025-03-26 DIAGNOSIS — E06.3 HASHIMOTO'S THYROIDITIS: ICD-10-CM

## 2025-03-26 DIAGNOSIS — Z79.4 TYPE 2 DIABETES MELLITUS WITH HYPERGLYCEMIA, WITH LONG-TERM CURRENT USE OF INSULIN: Primary | ICD-10-CM

## 2025-03-26 PROCEDURE — 99213 OFFICE O/P EST LOW 20 MIN: CPT | Mod: PBBFAC | Performed by: STUDENT IN AN ORGANIZED HEALTH CARE EDUCATION/TRAINING PROGRAM

## 2025-03-26 PROCEDURE — 99999 PR PBB SHADOW E&M-EST. PATIENT-LVL III: CPT | Mod: PBBFAC,,, | Performed by: STUDENT IN AN ORGANIZED HEALTH CARE EDUCATION/TRAINING PROGRAM

## 2025-03-26 RX ORDER — BLOOD-GLUCOSE TRANSMITTER
1 EACH MISCELLANEOUS
Qty: 1 EACH | Refills: 3 | Status: SHIPPED | OUTPATIENT
Start: 2025-03-26

## 2025-03-26 RX ORDER — DULAGLUTIDE 4.5 MG/.5ML
4.5 INJECTION, SOLUTION SUBCUTANEOUS
Qty: 12 PEN | Refills: 3 | Status: SHIPPED | OUTPATIENT
Start: 2025-03-26 | End: 2026-03-26

## 2025-03-26 RX ORDER — INSULIN PMP CART,AUT,G6/7,CNTR
1 EACH SUBCUTANEOUS ONCE
Qty: 1 EACH | Refills: 0 | Status: SHIPPED | OUTPATIENT
Start: 2025-03-26 | End: 2025-03-26

## 2025-03-26 RX ORDER — BLOOD-GLUCOSE SENSOR
1 EACH MISCELLANEOUS
Qty: 3 EACH | Refills: 11 | Status: SHIPPED | OUTPATIENT
Start: 2025-03-26 | End: 2026-03-26

## 2025-03-26 RX ORDER — INSULIN PMP CART,AUT,G6/7,CNTR
1 EACH SUBCUTANEOUS
Qty: 10 EACH | Refills: 3 | Status: SHIPPED | OUTPATIENT
Start: 2025-03-26

## 2025-03-26 NOTE — PROGRESS NOTES
Subjective:      Patient ID: Shirlene Shook is a 29 y.o. female.    Chief Complaint:  Type 2 diabetes mellitus    History of Present Illness  This is a 29 y.o. female. with a past medical history of Type 1 diabetes mellitus, Hashimoto's thyroiditis here for evaluation.        Type 1 diabetes mellitus - treating also as type 2 mellitus with insulin deficiency given insulin resistance  Diagnosed around age 17    Current diabetes medications:  - Trulicity 4.5 mg weekly  - Insulin Novolog via Tandem tslim X2 insulin pump since 2023  Control IQ: ON  Basal rate  12A: 1.0 U/h    ICR  12A: 10    ISF  12A:  28  8A:    30      Target: 130 (not used when Control IQ ON)  IAT: 5h      TDD 35 U      Past diabetes medications:  - Classic Omnipod  - Lantus  - Metformin      Lab Results   Component Value Date    CREATININE 0.8 11/12/2024    EGFRNORACEVR >60 11/12/2024       Known diabetic complications: none    Weight trend:    Wt Readings from Last 10 Encounters:   03/26/25 71.8 kg (158 lb 3.2 oz)   02/05/25 72.8 kg (160 lb 7.9 oz)   10/30/24 79.1 kg (174 lb 6.4 oz)   07/12/24 79.6 kg (175 lb 8 oz)   06/25/24 79.9 kg (176 lb 4.1 oz)   06/16/24 78.5 kg (173 lb 2.7 oz)   05/16/24 76.6 kg (168 lb 15.7 oz)   02/27/24 70.7 kg (155 lb 13.8 oz)   01/24/24 75.4 kg (166 lb 1.9 oz)   11/20/23 73.9 kg (163 lb)     Great grandmother type 1 diabetes    Prior visit with diabetes education: yes    Physical activity: Running, biking with children    Blood glucose monitoring at home: See download on media    She has 2 children   had vasectomy      Diabetes Management Status  Statin: Not taking  ACE/ARB: Not taking    Screening or Prevention Patient's value   HgA1C Testing and Control   Lab Results   Component Value Date    HGBA1C 6.0 (H) 11/12/2024        LDL control Lab Results   Component Value Date    LDLCALC 101.8 06/25/2024      Nephropathy screening Lab Results   Component Value Date    MICALBCREAT 7.3 06/25/2024        Last eye  "exam: : 08/17/2022          Hypothyroidism  Currently on levothyroxine 100 mcg daily  Reports takes 30 min before eating or drinking anything other than water.   Reports taking separate from multivitamins by at least 4 hours    Lab Results   Component Value Date    TSH 3.131 11/12/2024         Review of Systems  As above    Social and family history reviewed  Current medications and allergies reviewed    Objective:   /64   Ht 5' 1" (1.549 m)   Wt 71.8 kg (158 lb 3.2 oz)   BMI 29.89 kg/m²   Physical Exam  Alert, oriented    BP Readings from Last 1 Encounters:   03/26/25 110/64      Wt Readings from Last 1 Encounters:   03/26/25 1003 71.8 kg (158 lb 3.2 oz)     Body mass index is 29.89 kg/m².    Lab Review:   Lab Results   Component Value Date    HGBA1C 6.0 (H) 11/12/2024     Lab Results   Component Value Date    CHOL 171 06/25/2024    HDL 55 06/25/2024    LDLCALC 101.8 06/25/2024    TRIG 71 06/25/2024    CHOLHDL 32.2 06/25/2024     Lab Results   Component Value Date     11/12/2024    K 4.3 11/12/2024     11/12/2024    CO2 26 11/12/2024     (H) 11/12/2024    BUN 14 11/12/2024    CREATININE 0.8 11/12/2024    CALCIUM 9.4 11/12/2024    PROT 7.5 11/12/2024    ALBUMIN 4.0 11/12/2024    BILITOT 0.3 11/12/2024    ALKPHOS 56 11/12/2024    AST 17 11/12/2024    ALT 13 11/12/2024    ANIONGAP 8 11/12/2024    TSH 3.131 11/12/2024       All pertinent labs reviewed    Assessment and Plan     Type 1 diabetes mellitus with hyperglycemia  Glucose controlled with TIR > 80% and average glucose 130s on CGM. Hyperglycemia variable depending on intake. There is frequent hypoglycemia which has happened at different times related to both basal and boluses so will adjust accordingly.    Tolerating GLP-1 RA , will continue    She wishes to try Omnipod during the summer so, I sent order and if it is approved will switch to that      Plan  - Continue Dexcom G6  - Continue Trulicity 4.5 mg weekly  - Continue insulin " Novolog via Tandem tslim X2 insulin pump    Basal rate  12A: 1.0 U/h  7A:  0.9 U/h    ICR  12A: 11    ISF  12A:  35    Target: 110 (Control IQ ON)  IAT: 5h    F/u 4 months with labs    Insulin pump in place  Continue Tandem tslim X2 insulin pump  Send orders for Omnipod 5, will use during the summer if approved    Hashimoto's thyroiditis  Last TSH at goal for age  Continue levothyroxine 112 mcg daily  Recheck TSH with next labs and adjust as needed          Javed Luu MD  Endocrinology

## 2025-03-26 NOTE — ASSESSMENT & PLAN NOTE
Glucose controlled with TIR > 80% and average glucose 130s on CGM. Hyperglycemia variable depending on intake. There is frequent hypoglycemia which has happened at different times related to both basal and boluses so will adjust accordingly.    Tolerating GLP-1 RA , will continue    She wishes to try Omnipod during the summer so, I sent order and if it is approved will switch to that      Plan  - Continue Dexcom G6  - Continue Trulicity 4.5 mg weekly  - Continue insulin Novolog via Tandem tslim X2 insulin pump    Basal rate  12A: 1.0 U/h  7A:  0.9 U/h    ICR  12A: 11    ISF  12A:  35    Target: 110 (Control IQ ON)  IAT: 5h    F/u 4 months with labs

## 2025-03-26 NOTE — ASSESSMENT & PLAN NOTE
Last TSH at goal for age  Continue levothyroxine 112 mcg daily  Recheck TSH with next labs and adjust as needed

## 2025-03-26 NOTE — ASSESSMENT & PLAN NOTE
Continue Tandem tslim X2 insulin pump  Send orders for Omnipod 5, will use during the summer if approved

## 2025-03-31 ENCOUNTER — PATIENT MESSAGE (OUTPATIENT)
Dept: ADMINISTRATIVE | Facility: HOSPITAL | Age: 30
End: 2025-03-31
Payer: OTHER GOVERNMENT

## 2025-03-31 ENCOUNTER — PATIENT OUTREACH (OUTPATIENT)
Dept: ADMINISTRATIVE | Facility: HOSPITAL | Age: 30
End: 2025-03-31
Payer: OTHER GOVERNMENT

## 2025-03-31 NOTE — PROGRESS NOTES
Health Maintenance Due   Topic Date Due    Hepatitis C Screening  Never done    Foot Exam  Never done    HIV Screening  Never done    TETANUS VACCINE  Never done    Pneumococcal Vaccines (Age 0-49) (1 of 2 - PCV) Never done    Pap Smear  Never done    Diabetic Eye Exam  08/17/2023    Influenza Vaccine (1) Never done    COVID-19 Vaccine (1 - 2024-25 season) Never done     L/m for overdue health screenings  Pap  Diabetic eye exam  Office visit    Portal message sent

## 2025-04-06 ENCOUNTER — PATIENT MESSAGE (OUTPATIENT)
Dept: ENDOCRINOLOGY | Facility: CLINIC | Age: 30
End: 2025-04-06
Payer: OTHER GOVERNMENT

## 2025-04-07 DIAGNOSIS — E11.65 TYPE 2 DIABETES MELLITUS WITH HYPERGLYCEMIA, WITH LONG-TERM CURRENT USE OF INSULIN: ICD-10-CM

## 2025-04-07 DIAGNOSIS — Z79.4 TYPE 2 DIABETES MELLITUS WITH HYPERGLYCEMIA, WITH LONG-TERM CURRENT USE OF INSULIN: ICD-10-CM

## 2025-04-07 RX ORDER — INSULIN PMP CART,AUT,G6/7,CNTR
1 EACH SUBCUTANEOUS
Qty: 10 EACH | Refills: 11 | Status: SHIPPED | OUTPATIENT
Start: 2025-04-07

## 2025-04-27 ENCOUNTER — PATIENT MESSAGE (OUTPATIENT)
Dept: FAMILY MEDICINE | Facility: CLINIC | Age: 30
End: 2025-04-27
Payer: OTHER GOVERNMENT

## 2025-05-30 ENCOUNTER — PATIENT MESSAGE (OUTPATIENT)
Dept: FAMILY MEDICINE | Facility: CLINIC | Age: 30
End: 2025-05-30
Payer: OTHER GOVERNMENT

## 2025-06-11 ENCOUNTER — OFFICE VISIT (OUTPATIENT)
Dept: FAMILY MEDICINE | Facility: CLINIC | Age: 30
End: 2025-06-11
Payer: OTHER GOVERNMENT

## 2025-06-11 VITALS
DIASTOLIC BLOOD PRESSURE: 66 MMHG | HEART RATE: 92 BPM | WEIGHT: 156.06 LBS | BODY MASS INDEX: 29.47 KG/M2 | SYSTOLIC BLOOD PRESSURE: 98 MMHG | RESPIRATION RATE: 16 BRPM | HEIGHT: 61 IN

## 2025-06-11 DIAGNOSIS — E10.9 WELL CONTROLLED TYPE 1 DIABETES MELLITUS: Primary | ICD-10-CM

## 2025-06-11 DIAGNOSIS — K90.0 CELIAC DISEASE: ICD-10-CM

## 2025-06-11 DIAGNOSIS — F42.9 OBSESSIVE-COMPULSIVE DISORDER, UNSPECIFIED TYPE: ICD-10-CM

## 2025-06-11 DIAGNOSIS — F41.1 GAD (GENERALIZED ANXIETY DISORDER): ICD-10-CM

## 2025-06-11 DIAGNOSIS — L50.9 URTICARIA: ICD-10-CM

## 2025-06-11 PROCEDURE — 99999 PR PBB SHADOW E&M-EST. PATIENT-LVL IV: CPT | Mod: PBBFAC,,, | Performed by: FAMILY MEDICINE

## 2025-06-11 PROCEDURE — 99214 OFFICE O/P EST MOD 30 MIN: CPT | Mod: PBBFAC | Performed by: FAMILY MEDICINE

## 2025-06-11 PROCEDURE — 99214 OFFICE O/P EST MOD 30 MIN: CPT | Mod: S$PBB,,, | Performed by: FAMILY MEDICINE

## 2025-06-11 RX ORDER — FLUVOXAMINE MALEATE 25 MG/1
25 TABLET ORAL NIGHTLY
Qty: 30 TABLET | Refills: 11 | Status: SHIPPED | OUTPATIENT
Start: 2025-06-11 | End: 2026-06-11

## 2025-06-11 RX ORDER — INSULIN PMP CART,AUT,G6/7,CNTR
EACH SUBCUTANEOUS
COMMUNITY
Start: 2025-03-26 | End: 2025-06-11 | Stop reason: SDUPTHER

## 2025-06-13 DIAGNOSIS — Z79.4 TYPE 2 DIABETES MELLITUS WITH HYPERGLYCEMIA, WITH LONG-TERM CURRENT USE OF INSULIN: ICD-10-CM

## 2025-06-13 DIAGNOSIS — E11.65 TYPE 2 DIABETES MELLITUS WITH HYPERGLYCEMIA, WITH LONG-TERM CURRENT USE OF INSULIN: ICD-10-CM

## 2025-06-16 RX ORDER — INSULIN PMP CART,AUT,G6/7,CNTR
1 EACH SUBCUTANEOUS
Qty: 30 EACH | Refills: 3 | Status: SHIPPED | OUTPATIENT
Start: 2025-06-16

## 2025-06-21 NOTE — PROGRESS NOTES
Subjective:       Patient ID: Shirlene Shook is a 29 y.o. female.    Chief Complaint: Anxiety    History of Present Illness    Patient presents today with concerns about anxiety, OCD, and hives She reports experiencing anxiety and OCD symptoms, manifesting as compulsive checking of locked doors and stove, often driving back home to verify. She also experiences food-related anxiety, as evidenced by requesting to inspect restaurant kitchen facilities. She previously tried Zoloft with initial symptom improvement but discontinued due to weight gain. A trial of Lexapro also resulted in significant weight gain. She reports severe hives affecting multiple areas including stomach, breasts, neck, and scalp since March. She describes associated scalp burning sensation. She has been taking Zyrtec for symptom management. Her daughter has severe cashew allergy, with recent episode of projectile vomiting after inadvertent exposure through shared beverage containing cashew milk.          Objective:          Physical Exam  Vitals and nursing note reviewed.   Constitutional:       General: She is not in acute distress.     Appearance: She is well-developed.   HENT:      Head: Normocephalic and atraumatic.   Eyes:      Conjunctiva/sclera: Conjunctivae normal.      Pupils: Pupils are equal, round, and reactive to light.   Neck:      Thyroid: No thyromegaly.   Cardiovascular:      Rate and Rhythm: Normal rate and regular rhythm.      Heart sounds: Normal heart sounds.   Pulmonary:      Effort: Pulmonary effort is normal. No respiratory distress.      Breath sounds: Normal breath sounds. No wheezing.   Abdominal:      General: Bowel sounds are normal.      Palpations: Abdomen is soft.      Tenderness: There is no abdominal tenderness.   Musculoskeletal:         General: Normal range of motion.      Cervical back: Normal range of motion and neck supple.   Lymphadenopathy:      Cervical: No cervical adenopathy.   Skin:     General:  "Skin is warm and dry.      Findings: No rash.   Neurological:      Mental Status: She is alert and oriented to person, place, and time.   Psychiatric:         Behavior: Behavior normal.         Assessment:           1. Well controlled type 1 diabetes mellitus    2. Urticaria    3. TEE (generalized anxiety disorder)    4. Celiac disease    5. Obsessive-compulsive disorder, unspecified type        Plan:     Assessment & Plan    GENERALIZED ANXIETY DISORDER AND OCD:  - Explained the relationship between anxiety and OCD symptoms, including the difference between rational and irrational anxiety drivers.  - Assessed that the patient's anxiety is being driven by rational factors such as food sensitivities and autoimmune conditions, and that OCD traits are secondary to anxiety rather than the primary issue.  - Patient reports obsessive thoughts about food safety and contamination, as well as feeling anxious and stressed, which may be contributing to both her OCD symptoms and urticaria.  - Patient reports feeling better after discontinuing Zoloft, which was causing weight gain.  - Initiated Luvox, an SSRI that specifically targets OCD traits, as an alternative to previously used medications.  - Provided information on various SSRIs and their differing effects on weight.    URTICARIA (HIVES):  - Patient experiences severe hives covering the abdomen, breasts, neck, and scalp.  - Evaluated that Zyrtec helps alleviate hives, making an autoimmune cause less likely.  - Continued Zyrtec as needed for symptom management.  - Instructed patient to contact the office after 1 week of Luvox treatment to reassess hives and Zyrtec usage.    SKIN PARESTHESIA:  - Patient reports scalp feeling "like it's on fire," possibly related to hives.    FAMILY HISTORY AND ALLERGIES:  - Noted patient's family history of autoimmune conditions.  - Patient's daughter is severely allergic to cashews, which causes projectile vomiting and tongue " swelling.    DIAGNOSTIC TESTS AND REFERRALS:  - Ordered thyroid function test, CBC to check eosinophil levels to rule out underlying causes for hives, and HbA1c test.  - Referred to Dr. Garza for allergy evaluation, considering options between local allergist and [facility] for more comprehensive assessment.    FOLLOW-UP:  - Follow up after completing ordered labs.         Shirlene was seen today for anxiety.    Diagnoses and all orders for this visit:    Well controlled type 1 diabetes mellitus  -     Hemoglobin A1C; Future    Urticaria  -     TSH; Future  -     CBC Auto Differential; Future  -     Ambulatory referral/consult to Allergy; Future    TEE (generalized anxiety disorder)    Celiac disease    Obsessive-compulsive disorder, unspecified type    Other orders  -     fluvoxaMINE (LUVOX) 25 MG tablet; Take 1 tablet (25 mg total) by mouth every evening.          RTC if condition acutely worsens or any other concerns, otherwise RTC as scheduled      This note was generated with the assistance of ambient listening technology. Verbal consent was obtained by the patient and accompanying visitor(s) for the recording of patient appointment to facilitate this note. I attest to having reviewed and edited the generated note for accuracy, though some syntax or spelling errors may persist. Please contact the author of this note for any clarification.   .deep

## 2025-07-10 ENCOUNTER — PATIENT MESSAGE (OUTPATIENT)
Dept: FAMILY MEDICINE | Facility: CLINIC | Age: 30
End: 2025-07-10
Payer: OTHER GOVERNMENT

## 2025-07-16 ENCOUNTER — OFFICE VISIT (OUTPATIENT)
Dept: ENDOCRINOLOGY | Facility: CLINIC | Age: 30
End: 2025-07-16
Payer: OTHER GOVERNMENT

## 2025-07-16 ENCOUNTER — LAB VISIT (OUTPATIENT)
Dept: LAB | Facility: HOSPITAL | Age: 30
End: 2025-07-16
Attending: STUDENT IN AN ORGANIZED HEALTH CARE EDUCATION/TRAINING PROGRAM
Payer: OTHER GOVERNMENT

## 2025-07-16 VITALS
SYSTOLIC BLOOD PRESSURE: 104 MMHG | WEIGHT: 151.81 LBS | HEIGHT: 61 IN | BODY MASS INDEX: 28.66 KG/M2 | OXYGEN SATURATION: 98 % | RESPIRATION RATE: 18 BRPM | HEART RATE: 79 BPM | DIASTOLIC BLOOD PRESSURE: 72 MMHG

## 2025-07-16 DIAGNOSIS — E10.65 TYPE 1 DIABETES MELLITUS WITH HYPERGLYCEMIA: ICD-10-CM

## 2025-07-16 DIAGNOSIS — Z96.41 INSULIN PUMP IN PLACE: ICD-10-CM

## 2025-07-16 DIAGNOSIS — E06.3 HASHIMOTO'S THYROIDITIS: ICD-10-CM

## 2025-07-16 DIAGNOSIS — E10.65 TYPE 1 DIABETES MELLITUS WITH HYPERGLYCEMIA: Primary | ICD-10-CM

## 2025-07-16 LAB
ALBUMIN SERPL BCP-MCNC: 4 G/DL (ref 3.5–5.2)
ALBUMIN/CREAT UR: 6.7 UG/MG
ALP SERPL-CCNC: 53 UNIT/L (ref 40–150)
ALT SERPL W/O P-5'-P-CCNC: 7 UNIT/L (ref 10–44)
ANION GAP (OHS): 7 MMOL/L (ref 8–16)
AST SERPL-CCNC: 21 UNIT/L (ref 11–45)
BILIRUB SERPL-MCNC: 0.8 MG/DL (ref 0.1–1)
BUN SERPL-MCNC: 14 MG/DL (ref 6–20)
CALCIUM SERPL-MCNC: 9.5 MG/DL (ref 8.7–10.5)
CHLORIDE SERPL-SCNC: 103 MMOL/L (ref 95–110)
CHOLEST SERPL-MCNC: 158 MG/DL (ref 120–199)
CHOLEST/HDLC SERPL: 3.4 {RATIO} (ref 2–5)
CO2 SERPL-SCNC: 25 MMOL/L (ref 23–29)
CREAT SERPL-MCNC: 0.8 MG/DL (ref 0.5–1.4)
CREAT UR-MCNC: 149.2 MG/DL (ref 15–325)
EAG (OHS): 134 MG/DL (ref 68–131)
GFR SERPLBLD CREATININE-BSD FMLA CKD-EPI: >60 ML/MIN/1.73/M2
GLUCOSE SERPL-MCNC: 96 MG/DL (ref 70–110)
HBA1C MFR BLD: 6.3 % (ref 4–5.6)
HDLC SERPL-MCNC: 46 MG/DL (ref 40–75)
HDLC SERPL: 29.1 % (ref 20–50)
LDLC SERPL CALC-MCNC: 101.6 MG/DL (ref 63–159)
MICROALBUMIN UR-MCNC: 10 UG/ML (ref ?–5000)
NONHDLC SERPL-MCNC: 112 MG/DL
POTASSIUM SERPL-SCNC: 4.6 MMOL/L (ref 3.5–5.1)
PROT SERPL-MCNC: 7.7 GM/DL (ref 6–8.4)
SODIUM SERPL-SCNC: 135 MMOL/L (ref 136–145)
T4 FREE SERPL-MCNC: 1.29 NG/DL (ref 0.71–1.51)
T4 FREE SERPL-MCNC: 1.29 NG/DL (ref 0.71–1.51)
TRIGL SERPL-MCNC: 52 MG/DL (ref 30–150)
TSH SERPL-ACNC: 1.27 UIU/ML (ref 0.4–4)

## 2025-07-16 PROCEDURE — 99214 OFFICE O/P EST MOD 30 MIN: CPT | Mod: PBBFAC | Performed by: STUDENT IN AN ORGANIZED HEALTH CARE EDUCATION/TRAINING PROGRAM

## 2025-07-16 PROCEDURE — 99214 OFFICE O/P EST MOD 30 MIN: CPT | Mod: S$PBB,,, | Performed by: STUDENT IN AN ORGANIZED HEALTH CARE EDUCATION/TRAINING PROGRAM

## 2025-07-16 PROCEDURE — 82043 UR ALBUMIN QUANTITATIVE: CPT

## 2025-07-16 PROCEDURE — G2211 COMPLEX E/M VISIT ADD ON: HCPCS | Mod: ,,, | Performed by: STUDENT IN AN ORGANIZED HEALTH CARE EDUCATION/TRAINING PROGRAM

## 2025-07-16 PROCEDURE — 83036 HEMOGLOBIN GLYCOSYLATED A1C: CPT

## 2025-07-16 PROCEDURE — 82728 ASSAY OF FERRITIN: CPT

## 2025-07-16 PROCEDURE — 84439 ASSAY OF FREE THYROXINE: CPT

## 2025-07-16 PROCEDURE — 84480 ASSAY TRIIODOTHYRONINE (T3): CPT

## 2025-07-16 PROCEDURE — 80053 COMPREHEN METABOLIC PANEL: CPT

## 2025-07-16 PROCEDURE — 82465 ASSAY BLD/SERUM CHOLESTEROL: CPT

## 2025-07-16 PROCEDURE — 99999 PR PBB SHADOW E&M-EST. PATIENT-LVL IV: CPT | Mod: PBBFAC,,, | Performed by: STUDENT IN AN ORGANIZED HEALTH CARE EDUCATION/TRAINING PROGRAM

## 2025-07-16 PROCEDURE — 36415 COLL VENOUS BLD VENIPUNCTURE: CPT

## 2025-07-16 PROCEDURE — 95251 CONT GLUC MNTR ANALYSIS I&R: CPT | Mod: ,,, | Performed by: STUDENT IN AN ORGANIZED HEALTH CARE EDUCATION/TRAINING PROGRAM

## 2025-07-16 NOTE — ASSESSMENT & PLAN NOTE
Glucose controlled with TIR 70 and average glucose 150s on CGM. Hyperglycemia is more pronounced with Omnipod but reports changes in diet during the summer. I do think that the IAT of 5h is contributing to her persistent hyperglycemia after meals so, will adjust. Keep other settings the same for now.    Tolerating GLP-1 RA , will continue    Plan  - Continue Dexcom G6 (preference)  - Continue Trulicity 4.5 mg weekly  - Continue insulin Novolog via Omnipod 5 insulin pump  Basal rate: Auto Mode  ICR: 10  ISF: 35  Target: 110-110  IAT: 5h -> 4h    Labs now    F/u 4 months

## 2025-07-16 NOTE — PROGRESS NOTES
Subjective:      Patient ID: Shirlene Shook is a 29 y.o. female.    Chief Complaint:  Type 2 diabetes mellitus    History of Present Illness  This is a 29 y.o. female. with a past medical history of Type 1 diabetes mellitus, Hashimoto's thyroiditis here for evaluation.        Type 1 diabetes mellitus - treating also as type 2 mellitus with insulin deficiency given insulin resistance  Diagnosed around age 17    Current diabetes medications:  - Trulicity 4.5 mg weekly  - Insulin Novolog via Omnipod 5 (uses Omnipod during summer, Tandem tslim X2 rest of the year)  Basal rate: Auto Mode  ICR: 10  ISF: 35  Target: 110-110  IAT: 5h              Past diabetes medications:  - Classic Omnipod  - Lantus  - Metformin      Lab Results   Component Value Date    CREATININE 0.8 11/12/2024    EGFRNORACEVR >60 11/12/2024       Known diabetic complications: none    Weight trend:    Wt Readings from Last 10 Encounters:   07/16/25 68.9 kg (151 lb 12.8 oz)   06/11/25 70.8 kg (156 lb 1.4 oz)   03/26/25 71.8 kg (158 lb 3.2 oz)   02/05/25 72.8 kg (160 lb 7.9 oz)   10/30/24 79.1 kg (174 lb 6.4 oz)   07/12/24 79.6 kg (175 lb 8 oz)   06/25/24 79.9 kg (176 lb 4.1 oz)   06/16/24 78.5 kg (173 lb 2.7 oz)   05/16/24 76.6 kg (168 lb 15.7 oz)   02/27/24 70.7 kg (155 lb 13.8 oz)     Great grandmother type 1 diabetes    Prior visit with diabetes education: yes    Physical activity: Running, biking with children    She has 2 children   had vasectomy      Diabetes Management Status  Statin: Not taking  ACE/ARB: Not taking    Screening or Prevention Patient's value   HgA1C Testing and Control   Lab Results   Component Value Date    HGBA1C 6.0 (H) 11/12/2024        LDL control Lab Results   Component Value Date    LDLCALC 101.8 06/25/2024      Nephropathy screening Lab Results   Component Value Date    MICALBCREAT 7.3 06/25/2024        Last eye exam: : 08/17/2022          Hypothyroidism  Currently on levothyroxine 112 mcg daily  Reports takes  "30 min before eating or drinking anything other than water.   Reports taking separate from multivitamins by at least 4 hours    Lab Results   Component Value Date    TSH 3.131 11/12/2024         Review of Systems  As above    Social and family history reviewed  Current medications and allergies reviewed    Objective:   /72   Pulse 79   Resp 18   Ht 5' 1" (1.549 m)   Wt 68.9 kg (151 lb 12.8 oz)   SpO2 98%   BMI 28.68 kg/m²   Physical Exam  Alert, oriented    BP Readings from Last 1 Encounters:   07/16/25 104/72      Wt Readings from Last 1 Encounters:   07/16/25 1105 68.9 kg (151 lb 12.8 oz)     Body mass index is 28.68 kg/m².    Lab Review:   Lab Results   Component Value Date    HGBA1C 6.0 (H) 11/12/2024     Lab Results   Component Value Date    CHOL 171 06/25/2024    HDL 55 06/25/2024    LDLCALC 101.8 06/25/2024    TRIG 71 06/25/2024    CHOLHDL 32.2 06/25/2024     Lab Results   Component Value Date     11/12/2024    K 4.3 11/12/2024     11/12/2024    CO2 26 11/12/2024     (H) 11/12/2024    BUN 14 11/12/2024    CREATININE 0.8 11/12/2024    CALCIUM 9.4 11/12/2024    PROT 7.5 11/12/2024    ALBUMIN 4.0 11/12/2024    BILITOT 0.3 11/12/2024    ALKPHOS 56 11/12/2024    AST 17 11/12/2024    ALT 13 11/12/2024    ANIONGAP 8 11/12/2024    TSH 3.131 11/12/2024       All pertinent labs reviewed    Assessment and Plan     Type 1 diabetes mellitus with hyperglycemia  Glucose controlled with TIR 70 and average glucose 150s on CGM. Hyperglycemia is more pronounced with Omnipod but reports changes in diet during the summer. I do think that the IAT of 5h is contributing to her persistent hyperglycemia after meals so, will adjust. Keep other settings the same for now.    Tolerating GLP-1 RA , will continue    Plan  - Continue Dexcom G6 (preference)  - Continue Trulicity 4.5 mg weekly  - Continue insulin Novolog via Omnipod 5 insulin pump  Basal rate: Auto Mode  ICR: 10  ISF: 35  Target: 110-110  IAT: 5h " -> 4h    Labs now    F/u 4 months    Insulin pump in place  Continue Omnipod 5 insulin pump    Hashimoto's thyroiditis  Last TSH at goal for age  Continue levothyroxine 112 mcg daily  Recheck TSH with next labs and adjust as needed        Javed Luu MD   Endocrinology

## 2025-07-17 LAB
FERRITIN SERPL-MCNC: 99 NG/ML (ref 20–300)
T3FREE SERPL-MCNC: 90 NG/DL (ref 60–180)

## 2025-07-24 ENCOUNTER — PATIENT MESSAGE (OUTPATIENT)
Dept: FAMILY MEDICINE | Facility: CLINIC | Age: 30
End: 2025-07-24
Payer: OTHER GOVERNMENT

## 2025-08-22 DIAGNOSIS — E10.65 UNCONTROLLED TYPE 1 DIABETES MELLITUS WITH HYPERGLYCEMIA: ICD-10-CM

## 2025-08-22 RX ORDER — INSULIN ASPART 100 [IU]/ML
INJECTION, SOLUTION INTRAVENOUS; SUBCUTANEOUS
Qty: 120 ML | Refills: 3 | Status: SHIPPED | OUTPATIENT
Start: 2025-08-22

## 2025-08-25 ENCOUNTER — LAB VISIT (OUTPATIENT)
Dept: LAB | Facility: HOSPITAL | Age: 30
End: 2025-08-25
Payer: OTHER GOVERNMENT

## 2025-08-25 ENCOUNTER — OFFICE VISIT (OUTPATIENT)
Dept: ALLERGY | Facility: CLINIC | Age: 30
End: 2025-08-25
Payer: OTHER GOVERNMENT

## 2025-08-25 VITALS — BODY MASS INDEX: 29.81 KG/M2 | WEIGHT: 157.88 LBS | HEIGHT: 61 IN

## 2025-08-25 DIAGNOSIS — E06.3 HASHIMOTO'S THYROIDITIS: ICD-10-CM

## 2025-08-25 DIAGNOSIS — J31.0 CHRONIC RHINITIS: ICD-10-CM

## 2025-08-25 DIAGNOSIS — E10.65 TYPE 1 DIABETES MELLITUS WITH HYPERGLYCEMIA: ICD-10-CM

## 2025-08-25 DIAGNOSIS — F41.1 GAD (GENERALIZED ANXIETY DISORDER): ICD-10-CM

## 2025-08-25 DIAGNOSIS — Z91.018 HX OF FOOD ALLERGY: ICD-10-CM

## 2025-08-25 DIAGNOSIS — L50.8 CHRONIC URTICARIA: ICD-10-CM

## 2025-08-25 DIAGNOSIS — L50.8 CHRONIC URTICARIA: Primary | ICD-10-CM

## 2025-08-25 PROCEDURE — 86003 ALLG SPEC IGE CRUDE XTRC EA: CPT | Mod: 59

## 2025-08-25 PROCEDURE — 99999 PR PBB SHADOW E&M-EST. PATIENT-LVL III: CPT | Mod: PBBFAC,,, | Performed by: ALLERGY & IMMUNOLOGY

## 2025-08-25 PROCEDURE — 36415 COLL VENOUS BLD VENIPUNCTURE: CPT

## 2025-08-25 PROCEDURE — 82785 ASSAY OF IGE: CPT

## 2025-08-25 PROCEDURE — 99213 OFFICE O/P EST LOW 20 MIN: CPT | Mod: PBBFAC | Performed by: ALLERGY & IMMUNOLOGY

## 2025-08-25 PROCEDURE — 99204 OFFICE O/P NEW MOD 45 MIN: CPT | Mod: S$PBB,,, | Performed by: ALLERGY & IMMUNOLOGY

## 2025-08-25 PROCEDURE — 88184 FLOWCYTOMETRY/ TC 1 MARKER: CPT

## 2025-08-25 RX ORDER — MULTIVITAMIN WITH IRON
TABLET ORAL DAILY
COMMUNITY

## 2025-08-25 RX ORDER — CETIRIZINE HYDROCHLORIDE 10 MG/1
10 TABLET ORAL DAILY
COMMUNITY

## 2025-08-28 LAB
Lab: <0.1 KU/L
Lab: NORMAL
W ALLERGY INTERPRETATION: ABNORMAL
W ALTERNARIA ALTERNATA, CLASS: ABNORMAL
W ALTERNARIA ALTERNATA, IGE: <0.1 KU/L
W ASPERGILLUS FUMIGATUS, CLASS: ABNORMAL
W ASPERGILLUS FUMIGATUS, IGE: <0.1 KU/L
W BERMUDA GRASS, CLASS: ABNORMAL
W BERMUDA GRASS, IGE: <0.1 KU/L
W CAT DANDER, CLASS: ABNORMAL
W CAT DANDER, IGE: 0.68 KU/L
W CLADOSPORIUM HERBARUM, CLASS: ABNORMAL
W CLADOSPORIUM HERBARUM, IGE: <0.1 KU/L
W COCKROACH, GERMAN, CLASS: ABNORMAL
W COCKROACH, GERMAN, IGE: <0.1 KU/L
W COMMON PIGWEED, CLASS: ABNORMAL
W COMMON PIGWEED, IGE: <0.1 KU/L
W COMMON RAGWEED (SHORT), CLASS: ABNORMAL
W COMMON RAGWEED (SHORT), IGE: <0.1 KU/L
W COMMON SILVER BIRCH, CLASS: ABNORMAL
W COMMON SILVER BIRCH, IGE: <0.1 KU/L
W COW'S MILK, CLASS: ABNORMAL
W COW'S MILK, IGE: 0.21 KU/L
W DERMATOPHAGOIDES FARINAE CLASS: ABNORMAL
W DERMATOPHAGOIDES FARINAE, IGE: 8.02 KU/L
W DERMATOPHAGOIDES PTERONYSSINUS CLASS: ABNORMAL
W DERMATOPHAGOIDES PTERONYSSINUS, IGE: 5.98 KU/L
W DOG DANDER, CLASS: ABNORMAL
W DOG DANDER, IGE: 4.78 KU/L
W ELM, CLASS: ABNORMAL
W ELM, IGE: <0.1 KU/L
W IGE: 160 IU/ML
W MAPLE (BOX ELDER), CLASS: ABNORMAL
W MAPLE (BOX ELDER), IGE: <0.1 KU/L
W MOUNTAIN JUNIPER CLASS: ABNORMAL
W MOUNTAIN JUNIPER, IGE: <0.1 KU/L
W MOUSE URINE PROTEINS CLASS: ABNORMAL
W MOUSE URINE PROTEINS, IGE: <0.1 KU/L
W MULBERRY, CLASS: ABNORMAL
W MULBERRY, IGE: <0.1 KU/L
W OAK, CLASS: ABNORMAL
W OAK, IGE: <0.1 KU/L
W PECAN, HICKORY, CLASS: ABNORMAL
W PECAN, HICKORY, IGE: <0.1 KU/L
W PENICILLIUM CHRYSOGENUM, CLASS: ABNORMAL
W PENICILLIUM CHRYSOGENUM, IGE: <0.1 KU/L
W ROUGH MARSHELDER, CLASS: ABNORMAL
W ROUGH MARSHELDER, IGE: <0.1 KU/L
W TIMOTHY GRASS, CLASS: ABNORMAL
W TIMOTHY GRASS, IGE: 1.62 KU/L
W WALNUT TREE, CLASS: ABNORMAL
W WALNUT TREE, IGE: <0.1 KU/L